# Patient Record
Sex: FEMALE | Race: WHITE | NOT HISPANIC OR LATINO | Employment: OTHER | URBAN - METROPOLITAN AREA
[De-identification: names, ages, dates, MRNs, and addresses within clinical notes are randomized per-mention and may not be internally consistent; named-entity substitution may affect disease eponyms.]

---

## 2017-02-04 ENCOUNTER — APPOINTMENT (EMERGENCY)
Dept: RADIOLOGY | Facility: HOSPITAL | Age: 72
End: 2017-02-04
Payer: MEDICARE

## 2017-02-04 ENCOUNTER — HOSPITAL ENCOUNTER (EMERGENCY)
Facility: HOSPITAL | Age: 72
Discharge: HOME | End: 2017-02-05
Attending: EMERGENCY MEDICINE | Admitting: EMERGENCY MEDICINE
Payer: MEDICARE

## 2017-02-04 DIAGNOSIS — N20.0 KIDNEY STONE: Primary | ICD-10-CM

## 2017-02-04 LAB
ALBUMIN SERPL BCP-MCNC: 3.6 G/DL (ref 3.5–5)
ALP SERPL-CCNC: 210 U/L (ref 46–116)
ALT SERPL W P-5'-P-CCNC: 27 U/L (ref 12–78)
ANION GAP SERPL CALCULATED.3IONS-SCNC: 13 MMOL/L (ref 4–13)
AST SERPL W P-5'-P-CCNC: 19 U/L (ref 5–45)
BACTERIA UR QL AUTO: ABNORMAL /HPF
BASOPHILS # BLD AUTO: 0.01 THOUSANDS/ΜL (ref 0–0.1)
BASOPHILS NFR BLD AUTO: 0 % (ref 0–1)
BILIRUB SERPL-MCNC: 0.63 MG/DL (ref 0.2–1)
BILIRUB UR QL STRIP: NEGATIVE
BUN SERPL-MCNC: 15 MG/DL (ref 5–25)
CALCIUM SERPL-MCNC: 10.9 MG/DL (ref 8.3–10.1)
CHLORIDE SERPL-SCNC: 107 MMOL/L (ref 100–108)
CLARITY UR: CLEAR
CO2 SERPL-SCNC: 22 MMOL/L (ref 21–32)
COLOR UR: YELLOW
COLOR, POC: NORMAL
CREAT SERPL-MCNC: 0.86 MG/DL (ref 0.6–1.3)
EOSINOPHIL # BLD AUTO: 0.03 THOUSAND/ΜL (ref 0–0.61)
EOSINOPHIL NFR BLD AUTO: 0 % (ref 0–6)
ERYTHROCYTE [DISTWIDTH] IN BLOOD BY AUTOMATED COUNT: 12.6 % (ref 11.6–15.1)
GFR SERPL CREATININE-BSD FRML MDRD: >60 ML/MIN/1.73SQ M
GLUCOSE SERPL-MCNC: 117 MG/DL (ref 65–140)
GLUCOSE UR STRIP-MCNC: NEGATIVE MG/DL
HCT VFR BLD AUTO: 32.2 % (ref 34.8–46.1)
HGB BLD-MCNC: 10.7 G/DL (ref 11.5–15.4)
HGB UR QL STRIP.AUTO: ABNORMAL
HOLD SPECIMEN: NORMAL
HOLD SPECIMEN: NORMAL
HYALINE CASTS #/AREA URNS LPF: ABNORMAL /LPF
KETONES UR STRIP-MCNC: ABNORMAL MG/DL
LACTATE SERPL-SCNC: 3.1 MMOL/L (ref 0.5–2)
LEUKOCYTE ESTERASE UR QL STRIP: ABNORMAL
LIPASE SERPL-CCNC: 82 U/L (ref 73–393)
LYMPHOCYTES # BLD AUTO: 1.6 THOUSANDS/ΜL (ref 0.6–4.47)
LYMPHOCYTES NFR BLD AUTO: 23 % (ref 14–44)
MCH RBC QN AUTO: 28.2 PG (ref 26.8–34.3)
MCHC RBC AUTO-ENTMCNC: 33.2 G/DL (ref 31.4–37.4)
MCV RBC AUTO: 85 FL (ref 82–98)
MONOCYTES # BLD AUTO: 0.47 THOUSAND/ΜL (ref 0.17–1.22)
MONOCYTES NFR BLD AUTO: 7 % (ref 4–12)
NEUTROPHILS # BLD AUTO: 4.82 THOUSANDS/ΜL (ref 1.85–7.62)
NEUTS SEG NFR BLD AUTO: 70 % (ref 43–75)
NITRITE UR QL STRIP: NEGATIVE
NON-SQ EPI CELLS URNS QL MICRO: ABNORMAL /HPF
NRBC BLD AUTO-RTO: 0 /100 WBCS
PH UR STRIP.AUTO: 7.5 [PH] (ref 4.5–8)
PLATELET # BLD AUTO: 483 THOUSANDS/UL (ref 149–390)
PMV BLD AUTO: 8.9 FL (ref 8.9–12.7)
POTASSIUM SERPL-SCNC: 3.7 MMOL/L (ref 3.5–5.3)
PROT SERPL-MCNC: 7.9 G/DL (ref 6.4–8.2)
PROT UR STRIP-MCNC: NEGATIVE MG/DL
RBC # BLD AUTO: 3.8 MILLION/UL (ref 3.81–5.12)
RBC #/AREA URNS AUTO: ABNORMAL /HPF
SODIUM SERPL-SCNC: 142 MMOL/L (ref 136–145)
SP GR UR STRIP.AUTO: 1.02 (ref 1–1.03)
UROBILINOGEN UR QL STRIP.AUTO: 1 E.U./DL
WBC # BLD AUTO: 6.95 THOUSAND/UL (ref 4.31–10.16)
WBC #/AREA URNS AUTO: ABNORMAL /HPF

## 2017-02-04 PROCEDURE — 96361 HYDRATE IV INFUSION ADD-ON: CPT

## 2017-02-04 PROCEDURE — 80053 COMPREHEN METABOLIC PANEL: CPT

## 2017-02-04 PROCEDURE — 74177 CT ABD & PELVIS W/CONTRAST: CPT

## 2017-02-04 PROCEDURE — 96374 THER/PROPH/DIAG INJ IV PUSH: CPT

## 2017-02-04 PROCEDURE — 36415 COLL VENOUS BLD VENIPUNCTURE: CPT | Performed by: EMERGENCY MEDICINE

## 2017-02-04 PROCEDURE — 87086 URINE CULTURE/COLONY COUNT: CPT

## 2017-02-04 PROCEDURE — 81001 URINALYSIS AUTO W/SCOPE: CPT

## 2017-02-04 PROCEDURE — 83690 ASSAY OF LIPASE: CPT

## 2017-02-04 PROCEDURE — 96375 TX/PRO/DX INJ NEW DRUG ADDON: CPT

## 2017-02-04 PROCEDURE — 85025 COMPLETE CBC W/AUTO DIFF WBC: CPT

## 2017-02-04 PROCEDURE — 36415 COLL VENOUS BLD VENIPUNCTURE: CPT

## 2017-02-04 PROCEDURE — 83605 ASSAY OF LACTIC ACID: CPT | Performed by: EMERGENCY MEDICINE

## 2017-02-04 PROCEDURE — 81002 URINALYSIS NONAUTO W/O SCOPE: CPT | Performed by: EMERGENCY MEDICINE

## 2017-02-04 RX ORDER — ONDANSETRON 2 MG/ML
4 INJECTION INTRAMUSCULAR; INTRAVENOUS ONCE
Status: COMPLETED | OUTPATIENT
Start: 2017-02-04 | End: 2017-02-04

## 2017-02-04 RX ORDER — DIAZEPAM 5 MG/1
5 TABLET ORAL
COMMUNITY
Start: 2017-01-20 | End: 2018-11-23

## 2017-02-04 RX ORDER — CIPROFLOXACIN 500 MG/1
500 TABLET, FILM COATED ORAL ONCE
Status: COMPLETED | OUTPATIENT
Start: 2017-02-04 | End: 2017-02-05

## 2017-02-04 RX ORDER — OXYCODONE HYDROCHLORIDE AND ACETAMINOPHEN 5; 325 MG/1; MG/1
1-2 TABLET ORAL
COMMUNITY
Start: 2017-01-20 | End: 2018-11-23

## 2017-02-04 RX ADMIN — SODIUM CHLORIDE 1000 ML: 0.9 INJECTION, SOLUTION INTRAVENOUS at 21:35

## 2017-02-04 RX ADMIN — HYDROMORPHONE HYDROCHLORIDE 1 MG: 1 INJECTION, SOLUTION INTRAMUSCULAR; INTRAVENOUS; SUBCUTANEOUS at 21:35

## 2017-02-04 RX ADMIN — ONDANSETRON 4 MG: 2 INJECTION INTRAMUSCULAR; INTRAVENOUS at 21:35

## 2017-02-04 RX ADMIN — SODIUM CHLORIDE 500 ML: 0.9 INJECTION, SOLUTION INTRAVENOUS at 23:45

## 2017-02-04 RX ADMIN — IOHEXOL 99 ML: 350 INJECTION, SOLUTION INTRAVENOUS at 22:20

## 2017-02-05 VITALS
DIASTOLIC BLOOD PRESSURE: 76 MMHG | WEIGHT: 175 LBS | HEART RATE: 82 BPM | OXYGEN SATURATION: 94 % | TEMPERATURE: 98 F | RESPIRATION RATE: 18 BRPM | SYSTOLIC BLOOD PRESSURE: 151 MMHG

## 2017-02-05 LAB — LACTATE SERPL-SCNC: 1.8 MMOL/L (ref 0.5–2)

## 2017-02-05 PROCEDURE — 99284 EMERGENCY DEPT VISIT MOD MDM: CPT

## 2017-02-05 PROCEDURE — 83605 ASSAY OF LACTIC ACID: CPT | Performed by: EMERGENCY MEDICINE

## 2017-02-05 RX ORDER — ONDANSETRON 4 MG/1
4 TABLET, FILM COATED ORAL EVERY 8 HOURS PRN
Qty: 20 TABLET | Refills: 0 | Status: SHIPPED | OUTPATIENT
Start: 2017-02-05 | End: 2018-11-23

## 2017-02-05 RX ORDER — CIPROFLOXACIN 500 MG/1
500 TABLET, FILM COATED ORAL 2 TIMES DAILY
Qty: 14 TABLET | Refills: 0 | Status: SHIPPED | OUTPATIENT
Start: 2017-02-05 | End: 2017-02-12

## 2017-02-05 RX ADMIN — CIPROFLOXACIN HYDROCHLORIDE 500 MG: 500 TABLET, FILM COATED ORAL at 00:04

## 2017-02-06 LAB — BACTERIA UR CULT: NORMAL

## 2018-11-23 ENCOUNTER — HOSPITAL ENCOUNTER (EMERGENCY)
Facility: HOSPITAL | Age: 73
Discharge: HOME | End: 2018-11-24
Attending: EMERGENCY MEDICINE
Payer: MEDICARE

## 2018-11-23 ENCOUNTER — APPOINTMENT (EMERGENCY)
Dept: RADIOLOGY | Facility: HOSPITAL | Age: 73
End: 2018-11-23
Payer: MEDICARE

## 2018-11-23 VITALS
DIASTOLIC BLOOD PRESSURE: 64 MMHG | TEMPERATURE: 98.2 F | SYSTOLIC BLOOD PRESSURE: 138 MMHG | WEIGHT: 182 LBS | OXYGEN SATURATION: 97 % | RESPIRATION RATE: 18 BRPM | HEART RATE: 75 BPM

## 2018-11-23 DIAGNOSIS — J20.9 ACUTE BRONCHITIS: Primary | ICD-10-CM

## 2018-11-23 PROCEDURE — 71046 X-RAY EXAM CHEST 2 VIEWS: CPT

## 2018-11-23 PROCEDURE — 99283 EMERGENCY DEPT VISIT LOW MDM: CPT

## 2018-11-23 RX ORDER — AZITHROMYCIN 250 MG/1
250 TABLET, FILM COATED ORAL EVERY 24 HOURS
Qty: 4 TABLET | Refills: 0 | Status: SHIPPED | OUTPATIENT
Start: 2018-11-23 | End: 2018-11-27

## 2018-11-23 RX ORDER — AZITHROMYCIN 250 MG/1
500 TABLET, FILM COATED ORAL ONCE
Status: COMPLETED | OUTPATIENT
Start: 2018-11-24 | End: 2018-11-24

## 2018-11-24 RX ADMIN — AZITHROMYCIN 500 MG: 250 TABLET, FILM COATED ORAL at 00:02

## 2018-11-24 RX ADMIN — DEXAMETHASONE SODIUM PHOSPHATE 10 MG: 10 INJECTION, SOLUTION INTRAMUSCULAR; INTRAVENOUS at 00:02

## 2018-11-24 NOTE — DISCHARGE INSTRUCTIONS
Acute Bronchitis   WHAT YOU NEED TO KNOW:   Acute bronchitis is swelling and irritation in the air passages of your lungs  This irritation may cause you to cough or have other breathing problems  Acute bronchitis often starts because of another illness, such as a cold or the flu  The illness spreads from your nose and throat to your windpipe and airways  Bronchitis is often called a chest cold  Acute bronchitis lasts about 3 to 6 weeks and is usually not a serious illness  Your cough can last for several weeks  DISCHARGE INSTRUCTIONS:   Return to the emergency department if:   · You cough up blood  · Your lips or fingernails turn blue  · You feel like you are not getting enough air when you breathe  Contact your healthcare provider if:   · You have a fever  · Your breathing problems do not go away or get worse  · Your cough does not get better within 4 weeks  · You have questions or concerns about your condition or care  Self-care:   · Get more rest   Rest helps your body to heal  Slowly start to do more each day  Rest when you feel it is needed  · Avoid irritants in the air  Avoid chemicals, fumes, and dust  Wear a face mask if you must work around dust or fumes  Stay inside on days when air pollution levels are high  If you have allergies, stay inside when pollen counts are high  Do not use aerosol products, such as spray-on deodorant, bug spray, and hair spray  · Do not smoke or be around others who smoke  Nicotine and other chemicals in cigarettes and cigars damages the cilia that move mucus out of your lungs  Ask your healthcare provider for information if you currently smoke and need help to quit  E-cigarettes or smokeless tobacco still contain nicotine  Talk to your healthcare provider before you use these products  · Drink liquids as directed  Liquids help keep your air passages moist and help you cough up mucus   You may need to drink more liquids when you have acute bronchitis  Ask how much liquid to drink each day and which liquids are best for you  · Use a humidifier or vaporizer  Use a cool mist humidifier or a vaporizer to increase air moisture in your home  This may make it easier for you to breathe and help decrease your cough  Decrease risk for acute bronchitis:   · Get the vaccinations you need  Ask your healthcare provider if you should get vaccinated against the flu or pneumonia  · Prevent the spread of germs  You can decrease your risk of acute bronchitis and other illnesses by doing the following:     [facility] your hands often with soap and water  Carry germ-killing hand lotion or gel with you  You can use the lotion or gel to clean your hands when soap and water are not available  ¨ Do not touch your eyes, nose, or mouth unless you have washed your hands first     ¨ Always cover your mouth when you cough to prevent the spread of germs  It is best to cough into a tissue or your shirt sleeve instead of into your hand  Ask those around you cover their mouths when they cough  ¨ Try to avoid people who have a cold or the flu  If you are sick, stay away from others as much as possible  Medicines: Your healthcare provider may  give you any of the following:  · Ibuprofen or acetaminophen  are medicines that help lower your fever  They are available without a doctor's order  Ask your healthcare provider which medicine is right for you  Ask how much to take and how often to take it  Follow directions  These medicines can cause stomach bleeding if not taken correctly  Ibuprofen can cause kidney damage  Do not take ibuprofen if you have kidney disease, an ulcer, or allergies to aspirin  Acetaminophen can cause liver damage  Do not take more than 4,000 milligrams in 24 hours  · Decongestants  help loosen mucus in your lungs and make it easier to cough up  This can help you breathe easier  · Cough suppressants  decrease your urge to cough   If your cough produces mucus, do not take a cough suppressant unless your healthcare provider tells you to  Your healthcare provider may suggest that you take a cough suppressant at night so you can rest     · Inhalers  may be given  Your healthcare provider may give you one or more inhalers to help you breathe easier and cough less  An inhaler gives your medicine to open your airways  Ask your healthcare provider to show you how to use your inhaler correctly  · Take your medicine as directed  Contact your healthcare provider if you think your medicine is not helping or if you have side effects  Tell him of her if you are allergic to any medicine  Keep a list of the medicines, vitamins, and herbs you take  Include the amounts, and when and why you take them  Bring the list or the pill bottles to follow-up visits  Carry your medicine list with you in case of an emergency  Follow up with your healthcare provider as directed:  Write down questions you have so you will remember to ask them during your follow-up visits  © 2017 [address] [employer] Information is for End User's use only and may not be sold, redistributed or otherwise used for commercial purposes  All illustrations and images included in CareNotes® are the copyrighted property of [employer]  or [employer]  The above information is an  only  It is not intended as medical advice for individual conditions or treatments  Talk to your doctor, nurse or pharmacist before following any medical regimen to see if it is safe and effective for you

## 2018-11-24 NOTE — ED PROVIDER NOTES
History  Chief Complaint   Patient presents with    Cough     patient c/o of a cough that started 1 week ago; patient also c/o of chest pain from coughing; patient has been taking medications at home with no relief      HPI     68 healthy female p/w cough/congestion  Onset 1 week ago  Hacking  Possibly productive  Constant  Waxing and waning  asssoc nasal congestion  No dyspnea/cp  No other a/e factors  Moderate intensity  Concerned because has 3year old grandchild  Not smoker  No daily meds  Exam unremarkable  A/P: cough, cxr to r/o pna  Symptomatic tx  None       History reviewed  No pertinent past medical history  Past Surgical History:   Procedure Laterality Date    BACK SURGERY      FOOT SURGERY         History reviewed  No pertinent family history  I have reviewed and agree with the history as documented  Social History   Substance Use Topics    Smoking status: Never Smoker    Smokeless tobacco: Never Used    Alcohol use No        Review of Systems   Constitutional: Negative for chills, diaphoresis, fatigue and fever  HENT: Negative for facial swelling and nosebleeds  Eyes: Negative for photophobia, pain and visual disturbance  Respiratory: Positive for cough  Negative for apnea, shortness of breath and wheezing  Cardiovascular: Negative for chest pain, palpitations and leg swelling  Gastrointestinal: Negative for abdominal distention, abdominal pain, anal bleeding, blood in stool, diarrhea, nausea, rectal pain and vomiting  Endocrine: Negative for polydipsia and polyuria  Genitourinary: Negative for decreased urine volume, difficulty urinating, dysuria, flank pain and frequency  Musculoskeletal: Negative for back pain, neck pain and neck stiffness  Skin: Negative for color change and rash  Allergic/Immunologic: Negative for environmental allergies and immunocompromised state     Neurological: Negative for dizziness, syncope, weakness, light-headedness and headaches  Hematological: Negative for adenopathy  Does not bruise/bleed easily  Psychiatric/Behavioral: Negative for dysphoric mood  The patient is not nervous/anxious  All other systems reviewed and are negative  Physical Exam  Physical Exam   Constitutional: She is oriented to person, place, and time  She appears well-developed and well-nourished  No distress  HENT:   Head: Normocephalic and atraumatic  Nose: Nose normal    Eyes: Pupils are equal, round, and reactive to light  Conjunctivae and EOM are normal  No scleral icterus  Neck: Normal range of motion  Neck supple  No JVD present  No tracheal deviation present  No thyromegaly present  Cardiovascular: Normal rate, regular rhythm, normal heart sounds and intact distal pulses  Exam reveals no gallop and no friction rub  Pulmonary/Chest: Effort normal and breath sounds normal  No respiratory distress  She has no wheezes  She has no rales  She exhibits no tenderness  Abdominal: Soft  Bowel sounds are normal  She exhibits no distension and no mass  There is no tenderness  There is no rebound and no guarding  No hernia  Musculoskeletal: Normal range of motion  She exhibits no edema, tenderness or deformity  Neurological: She is alert and oriented to person, place, and time  She has normal reflexes  No cranial nerve deficit  Coordination normal    Skin: Skin is warm and dry  She is not diaphoretic  No erythema  Psychiatric: She has a normal mood and affect  Her behavior is normal    Nursing note and vitals reviewed        Vital Signs  ED Triage Vitals   Temperature Pulse Respirations Blood Pressure SpO2   11/23/18 2209 11/23/18 2209 11/23/18 2209 11/23/18 2209 11/23/18 2209   98 2 °F (36 8 °C) 78 20 (!) 172/73 98 %      Temp Source Heart Rate Source Patient Position - Orthostatic VS BP Location FiO2 (%)   11/23/18 2209 11/23/18 2209 11/23/18 2209 11/23/18 2209 --   Temporal Monitor Sitting Left arm       Pain Score       11/23/18 7374 No Pain           Vitals:    11/23/18 2209 11/23/18 2331   BP: (!) 172/73 138/64   Pulse: 78 75   Patient Position - Orthostatic VS: Sitting Sitting       Visual Acuity      ED Medications  Medications   azithromycin (ZITHROMAX) tablet 500 mg (500 mg Oral Given 11/24/18 0002)   dexamethasone 10 mg/mL oral liquid 10 mg 1 mL (10 mg Oral Given 11/24/18 0002)       Diagnostic Studies  Results Reviewed     None                 XR chest 2 views   Final Result by Fannie Guzman DO (11/24 1374)   No acute cardiopulmonary disease  Workstation performed: [id number]                    Procedures  Procedures       Phone Contacts  ED Phone Contact    ED Course                               MDM  Number of Diagnoses or Management Options  Acute bronchitis: new and requires workup     Amount and/or Complexity of Data Reviewed  Tests in the radiology section of CPT®: ordered  Independent visualization of images, tracings, or specimens: yes      CritCare Time    Disposition  Final diagnoses:   Acute bronchitis     Time reflects when diagnosis was documented in both MDM as applicable and the Disposition within this note     Time User Action Codes Description Comment    11/23/2018 11:55 PM Shaka Avery [J20 9] Acute bronchitis       ED Disposition     ED Disposition Condition Comment    Discharge  2 [facility] discharge to home/self care  Condition at discharge: Good        Follow-up Information     Follow up With Specialties Details Why Contact Info    Chriss Montilla DO   If symptoms worsen [address] [place] [address]  [phone]            Discharge Medication List as of 11/24/2018 12:00 AM      START taking these medications    Details   azithromycin (ZITHROMAX) 250 mg tablet Take 1 tablet (250 mg total) by mouth every 24 hours for 4 days, Starting Fri 11/23/2018, Until Tue 11/27/2018, Print           No discharge procedures on file      ED Provider  Electronically Signed by Kymberly Rodriguez, DO  11/24/18 1115

## 2018-11-24 NOTE — ED NOTES
Went in to do an EKG on pt due to pt complaint  Pt requested to "speak with a doctor", to see if EKG is needed before said test is performed  Pt very complaint and agreeable  RN made aware        Johanna Mora  11/23/18 1077

## 2019-03-30 ENCOUNTER — HOSPITAL ENCOUNTER (OUTPATIENT)
Facility: HOSPITAL | Age: 74
Setting detail: OBSERVATION
Discharge: HOME | End: 2019-03-31
Attending: EMERGENCY MEDICINE | Admitting: HOSPITALIST
Payer: MEDICARE

## 2019-03-30 ENCOUNTER — APPOINTMENT (EMERGENCY)
Dept: CT IMAGING | Facility: HOSPITAL | Age: 74
End: 2019-03-30
Payer: MEDICARE

## 2019-03-30 DIAGNOSIS — N39.0 URINARY TRACT INFECTION WITHOUT HEMATURIA, SITE UNSPECIFIED: ICD-10-CM

## 2019-03-30 DIAGNOSIS — N12 PYELONEPHRITIS: Primary | ICD-10-CM

## 2019-03-30 DIAGNOSIS — N20.1 URETERAL CALCULUS: ICD-10-CM

## 2019-03-30 DIAGNOSIS — N20.1 CALCULUS OF PROXIMAL RIGHT URETER: ICD-10-CM

## 2019-03-30 PROBLEM — N13.30 HYDRONEPHROSIS: Status: ACTIVE | Noted: 2019-03-30

## 2019-03-30 PROBLEM — I10 ACCELERATED HYPERTENSION: Status: ACTIVE | Noted: 2019-03-30

## 2019-03-30 LAB
ALBUMIN SERPL BCP-MCNC: 3.6 G/DL (ref 3.5–5)
ALP SERPL-CCNC: 156 U/L (ref 46–116)
ALT SERPL W P-5'-P-CCNC: 36 U/L (ref 12–78)
ANION GAP SERPL CALCULATED.3IONS-SCNC: 13 MMOL/L (ref 4–13)
AST SERPL W P-5'-P-CCNC: 31 U/L (ref 5–45)
BACTERIA UR QL AUTO: ABNORMAL /HPF
BASOPHILS # BLD AUTO: 0.02 THOUSANDS/ΜL (ref 0–0.1)
BASOPHILS NFR BLD AUTO: 0 % (ref 0–1)
BILIRUB SERPL-MCNC: 0.68 MG/DL (ref 0.2–1)
BILIRUB UR QL STRIP: NEGATIVE
BUN SERPL-MCNC: 16 MG/DL (ref 5–25)
CALCIUM SERPL-MCNC: 10.4 MG/DL (ref 8.3–10.1)
CHLORIDE SERPL-SCNC: 104 MMOL/L (ref 100–108)
CLARITY UR: ABNORMAL
CLARITY, POC: ABNORMAL
CO2 SERPL-SCNC: 25 MMOL/L (ref 21–32)
COLOR UR: ABNORMAL
COLOR, POC: ABNORMAL
CREAT SERPL-MCNC: 0.63 MG/DL (ref 0.6–1.3)
EOSINOPHIL # BLD AUTO: 0.08 THOUSAND/ΜL (ref 0–0.61)
EOSINOPHIL NFR BLD AUTO: 2 % (ref 0–6)
ERYTHROCYTE [DISTWIDTH] IN BLOOD BY AUTOMATED COUNT: 12.3 % (ref 11.6–15.1)
GFR SERPL CREATININE-BSD FRML MDRD: 89 ML/MIN/1.73SQ M
GLUCOSE SERPL-MCNC: 132 MG/DL (ref 65–140)
GLUCOSE UR STRIP-MCNC: NEGATIVE MG/DL
HCT VFR BLD AUTO: 42.8 % (ref 34.8–46.1)
HGB BLD-MCNC: 14.2 G/DL (ref 11.5–15.4)
HGB UR QL STRIP.AUTO: ABNORMAL
IMM GRANULOCYTES # BLD AUTO: 0.01 THOUSAND/UL (ref 0–0.2)
IMM GRANULOCYTES NFR BLD AUTO: 0 % (ref 0–2)
KETONES UR STRIP-MCNC: NEGATIVE MG/DL
LEUKOCYTE ESTERASE UR QL STRIP: ABNORMAL
LYMPHOCYTES # BLD AUTO: 1.7 THOUSANDS/ΜL (ref 0.6–4.47)
LYMPHOCYTES NFR BLD AUTO: 32 % (ref 14–44)
MCH RBC QN AUTO: 28.9 PG (ref 26.8–34.3)
MCHC RBC AUTO-ENTMCNC: 33.2 G/DL (ref 31.4–37.4)
MCV RBC AUTO: 87 FL (ref 82–98)
MONOCYTES # BLD AUTO: 0.31 THOUSAND/ΜL (ref 0.17–1.22)
MONOCYTES NFR BLD AUTO: 6 % (ref 4–12)
NEUTROPHILS # BLD AUTO: 3.26 THOUSANDS/ΜL (ref 1.85–7.62)
NEUTS SEG NFR BLD AUTO: 60 % (ref 43–75)
NITRITE UR QL STRIP: NEGATIVE
NON-SQ EPI CELLS URNS QL MICRO: ABNORMAL /HPF
NRBC BLD AUTO-RTO: 0 /100 WBCS
PH UR STRIP.AUTO: 6 [PH] (ref 4.5–8)
PLATELET # BLD AUTO: 250 THOUSANDS/UL (ref 149–390)
PMV BLD AUTO: 9.6 FL (ref 8.9–12.7)
POTASSIUM SERPL-SCNC: 3.8 MMOL/L (ref 3.5–5.3)
PROT SERPL-MCNC: 7.5 G/DL (ref 6.4–8.2)
PROT UR STRIP-MCNC: NEGATIVE MG/DL
RBC # BLD AUTO: 4.92 MILLION/UL (ref 3.81–5.12)
RBC #/AREA URNS AUTO: ABNORMAL /HPF
SODIUM SERPL-SCNC: 142 MMOL/L (ref 136–145)
SP GR UR STRIP.AUTO: 1.02 (ref 1–1.03)
UROBILINOGEN UR QL STRIP.AUTO: 0.2 E.U./DL
WBC # BLD AUTO: 5.38 THOUSAND/UL (ref 4.31–10.16)
WBC #/AREA URNS AUTO: ABNORMAL /HPF

## 2019-03-30 PROCEDURE — 87086 URINE CULTURE/COLONY COUNT: CPT | Performed by: PHYSICIAN ASSISTANT

## 2019-03-30 PROCEDURE — 85025 COMPLETE CBC W/AUTO DIFF WBC: CPT | Performed by: EMERGENCY MEDICINE

## 2019-03-30 PROCEDURE — 80053 COMPREHEN METABOLIC PANEL: CPT | Performed by: EMERGENCY MEDICINE

## 2019-03-30 PROCEDURE — 81001 URINALYSIS AUTO W/SCOPE: CPT

## 2019-03-30 PROCEDURE — 96376 TX/PRO/DX INJ SAME DRUG ADON: CPT

## 2019-03-30 PROCEDURE — 36415 COLL VENOUS BLD VENIPUNCTURE: CPT | Performed by: EMERGENCY MEDICINE

## 2019-03-30 PROCEDURE — 1123F ACP DISCUSS/DSCN MKR DOCD: CPT | Performed by: HOSPITALIST

## 2019-03-30 PROCEDURE — 99220 PR INITIAL OBSERVATION CARE/DAY 70 MINUTES: CPT | Performed by: HOSPITALIST

## 2019-03-30 PROCEDURE — 96374 THER/PROPH/DIAG INJ IV PUSH: CPT

## 2019-03-30 PROCEDURE — 74176 CT ABD & PELVIS W/O CONTRAST: CPT

## 2019-03-30 PROCEDURE — 99285 EMERGENCY DEPT VISIT HI MDM: CPT

## 2019-03-30 PROCEDURE — 96375 TX/PRO/DX INJ NEW DRUG ADDON: CPT

## 2019-03-30 RX ORDER — MORPHINE SULFATE 4 MG/ML
4 INJECTION, SOLUTION INTRAMUSCULAR; INTRAVENOUS EVERY 4 HOURS PRN
Status: DISCONTINUED | OUTPATIENT
Start: 2019-03-30 | End: 2019-03-31 | Stop reason: HOSPADM

## 2019-03-30 RX ORDER — ACETAMINOPHEN 325 MG/1
650 TABLET ORAL EVERY 6 HOURS PRN
Status: DISCONTINUED | OUTPATIENT
Start: 2019-03-30 | End: 2019-03-31 | Stop reason: HOSPADM

## 2019-03-30 RX ORDER — HYDRALAZINE HYDROCHLORIDE 20 MG/ML
10 INJECTION INTRAMUSCULAR; INTRAVENOUS EVERY 4 HOURS PRN
Status: DISCONTINUED | OUTPATIENT
Start: 2019-03-30 | End: 2019-03-31 | Stop reason: HOSPADM

## 2019-03-30 RX ORDER — MORPHINE SULFATE 4 MG/ML
4 INJECTION, SOLUTION INTRAMUSCULAR; INTRAVENOUS
Status: COMPLETED | OUTPATIENT
Start: 2019-03-30 | End: 2019-03-30

## 2019-03-30 RX ORDER — ONDANSETRON 2 MG/ML
4 INJECTION INTRAMUSCULAR; INTRAVENOUS EVERY 6 HOURS PRN
Status: DISCONTINUED | OUTPATIENT
Start: 2019-03-30 | End: 2019-03-31 | Stop reason: HOSPADM

## 2019-03-30 RX ORDER — ONDANSETRON 2 MG/ML
4 INJECTION INTRAMUSCULAR; INTRAVENOUS ONCE
Status: COMPLETED | OUTPATIENT
Start: 2019-03-30 | End: 2019-03-30

## 2019-03-30 RX ORDER — TEMAZEPAM 15 MG/1
15 CAPSULE ORAL ONCE
Status: COMPLETED | OUTPATIENT
Start: 2019-03-30 | End: 2019-03-30

## 2019-03-30 RX ORDER — MORPHINE SULFATE 4 MG/ML
4 INJECTION, SOLUTION INTRAMUSCULAR; INTRAVENOUS ONCE AS NEEDED
Status: DISCONTINUED | OUTPATIENT
Start: 2019-03-30 | End: 2019-03-31 | Stop reason: HOSPADM

## 2019-03-30 RX ORDER — SODIUM CHLORIDE 9 MG/ML
125 INJECTION, SOLUTION INTRAVENOUS CONTINUOUS
Status: DISCONTINUED | OUTPATIENT
Start: 2019-03-30 | End: 2019-03-31 | Stop reason: HOSPADM

## 2019-03-30 RX ORDER — KETOROLAC TROMETHAMINE 30 MG/ML
30 INJECTION, SOLUTION INTRAMUSCULAR; INTRAVENOUS EVERY 6 HOURS PRN
Status: DISCONTINUED | OUTPATIENT
Start: 2019-03-30 | End: 2019-03-31 | Stop reason: HOSPADM

## 2019-03-30 RX ADMIN — MORPHINE SULFATE 4 MG: 4 INJECTION INTRAVENOUS at 09:56

## 2019-03-30 RX ADMIN — HYDRALAZINE HYDROCHLORIDE 10 MG: 20 INJECTION INTRAMUSCULAR; INTRAVENOUS at 13:51

## 2019-03-30 RX ADMIN — MORPHINE SULFATE 4 MG: 4 INJECTION INTRAVENOUS at 09:25

## 2019-03-30 RX ADMIN — TEMAZEPAM 15 MG: 15 CAPSULE ORAL at 22:50

## 2019-03-30 RX ADMIN — SODIUM CHLORIDE 125 ML/HR: 0.9 INJECTION, SOLUTION INTRAVENOUS at 22:27

## 2019-03-30 RX ADMIN — MORPHINE SULFATE 4 MG: 4 INJECTION INTRAVENOUS at 14:46

## 2019-03-30 RX ADMIN — ONDANSETRON 4 MG: 2 INJECTION INTRAMUSCULAR; INTRAVENOUS at 14:44

## 2019-03-30 RX ADMIN — ONDANSETRON 4 MG: 2 INJECTION INTRAMUSCULAR; INTRAVENOUS at 09:29

## 2019-03-30 RX ADMIN — SODIUM CHLORIDE 125 ML/HR: 0.9 INJECTION, SOLUTION INTRAVENOUS at 14:34

## 2019-03-30 RX ADMIN — MORPHINE SULFATE 4 MG: 4 INJECTION INTRAVENOUS at 10:47

## 2019-03-30 RX ADMIN — CEFTRIAXONE 1000 MG: 1 INJECTION, POWDER, FOR SOLUTION INTRAMUSCULAR; INTRAVENOUS at 12:44

## 2019-03-31 VITALS
OXYGEN SATURATION: 94 % | RESPIRATION RATE: 18 BRPM | SYSTOLIC BLOOD PRESSURE: 140 MMHG | WEIGHT: 182 LBS | TEMPERATURE: 97.4 F | HEIGHT: 63 IN | HEART RATE: 85 BPM | BODY MASS INDEX: 32.25 KG/M2 | DIASTOLIC BLOOD PRESSURE: 61 MMHG

## 2019-03-31 LAB
ANION GAP SERPL CALCULATED.3IONS-SCNC: 8 MMOL/L (ref 4–13)
BACTERIA UR CULT: NORMAL
BASOPHILS # BLD AUTO: 0.01 THOUSANDS/ΜL (ref 0–0.1)
BASOPHILS NFR BLD AUTO: 0 % (ref 0–1)
BUN SERPL-MCNC: 16 MG/DL (ref 5–25)
CALCIUM SERPL-MCNC: 9.5 MG/DL (ref 8.3–10.1)
CHLORIDE SERPL-SCNC: 109 MMOL/L (ref 100–108)
CO2 SERPL-SCNC: 25 MMOL/L (ref 21–32)
CREAT SERPL-MCNC: 0.58 MG/DL (ref 0.6–1.3)
EOSINOPHIL # BLD AUTO: 0.11 THOUSAND/ΜL (ref 0–0.61)
EOSINOPHIL NFR BLD AUTO: 3 % (ref 0–6)
ERYTHROCYTE [DISTWIDTH] IN BLOOD BY AUTOMATED COUNT: 12.5 % (ref 11.6–15.1)
GFR SERPL CREATININE-BSD FRML MDRD: 92 ML/MIN/1.73SQ M
GLUCOSE SERPL-MCNC: 103 MG/DL (ref 65–140)
HCT VFR BLD AUTO: 36.2 % (ref 34.8–46.1)
HGB BLD-MCNC: 11.6 G/DL (ref 11.5–15.4)
IMM GRANULOCYTES # BLD AUTO: 0.01 THOUSAND/UL (ref 0–0.2)
IMM GRANULOCYTES NFR BLD AUTO: 0 % (ref 0–2)
LYMPHOCYTES # BLD AUTO: 1.03 THOUSANDS/ΜL (ref 0.6–4.47)
LYMPHOCYTES NFR BLD AUTO: 26 % (ref 14–44)
MCH RBC QN AUTO: 29.1 PG (ref 26.8–34.3)
MCHC RBC AUTO-ENTMCNC: 32 G/DL (ref 31.4–37.4)
MCV RBC AUTO: 91 FL (ref 82–98)
MONOCYTES # BLD AUTO: 0.36 THOUSAND/ΜL (ref 0.17–1.22)
MONOCYTES NFR BLD AUTO: 9 % (ref 4–12)
NEUTROPHILS # BLD AUTO: 2.43 THOUSANDS/ΜL (ref 1.85–7.62)
NEUTS SEG NFR BLD AUTO: 62 % (ref 43–75)
NRBC BLD AUTO-RTO: 0 /100 WBCS
PLATELET # BLD AUTO: 200 THOUSANDS/UL (ref 149–390)
PMV BLD AUTO: 10 FL (ref 8.9–12.7)
POTASSIUM SERPL-SCNC: 3.5 MMOL/L (ref 3.5–5.3)
RBC # BLD AUTO: 3.98 MILLION/UL (ref 3.81–5.12)
SODIUM SERPL-SCNC: 142 MMOL/L (ref 136–145)
WBC # BLD AUTO: 3.95 THOUSAND/UL (ref 4.31–10.16)

## 2019-03-31 PROCEDURE — 80048 BASIC METABOLIC PNL TOTAL CA: CPT | Performed by: PHYSICIAN ASSISTANT

## 2019-03-31 PROCEDURE — 85025 COMPLETE CBC W/AUTO DIFF WBC: CPT | Performed by: PHYSICIAN ASSISTANT

## 2019-03-31 PROCEDURE — 99204 OFFICE O/P NEW MOD 45 MIN: CPT | Performed by: UROLOGY

## 2019-03-31 PROCEDURE — 99217 PR OBSERVATION CARE DISCHARGE MANAGEMENT: CPT | Performed by: HOSPITALIST

## 2019-03-31 RX ORDER — CEPHALEXIN 500 MG/1
500 CAPSULE ORAL EVERY 12 HOURS SCHEDULED
Qty: 12 CAPSULE | Refills: 0 | Status: SHIPPED | OUTPATIENT
Start: 2019-03-31 | End: 2019-04-06

## 2019-03-31 RX ORDER — TAMSULOSIN HYDROCHLORIDE 0.4 MG/1
0.4 CAPSULE ORAL
Qty: 30 CAPSULE | Refills: 0 | Status: SHIPPED | OUTPATIENT
Start: 2019-03-31 | End: 2019-04-09

## 2019-03-31 RX ORDER — TAMSULOSIN HYDROCHLORIDE 0.4 MG/1
0.4 CAPSULE ORAL
Status: DISCONTINUED | OUTPATIENT
Start: 2019-03-31 | End: 2019-03-31 | Stop reason: HOSPADM

## 2019-03-31 RX ADMIN — TAMSULOSIN HYDROCHLORIDE 0.4 MG: 0.4 CAPSULE ORAL at 13:21

## 2019-03-31 RX ADMIN — ENOXAPARIN SODIUM 40 MG: 40 INJECTION SUBCUTANEOUS at 08:27

## 2019-03-31 RX ADMIN — SODIUM CHLORIDE 125 ML/HR: 0.9 INJECTION, SOLUTION INTRAVENOUS at 06:35

## 2019-04-09 ENCOUNTER — HOSPITAL ENCOUNTER (EMERGENCY)
Facility: HOSPITAL | Age: 74
Discharge: HOME | End: 2019-04-09
Attending: EMERGENCY MEDICINE | Admitting: EMERGENCY MEDICINE
Payer: MEDICARE

## 2019-04-09 ENCOUNTER — APPOINTMENT (EMERGENCY)
Dept: CT IMAGING | Facility: HOSPITAL | Age: 74
End: 2019-04-09
Payer: MEDICARE

## 2019-04-09 ENCOUNTER — TELEPHONE (OUTPATIENT)
Dept: UROLOGY | Facility: MEDICAL CENTER | Age: 74
End: 2019-04-09

## 2019-04-09 VITALS
DIASTOLIC BLOOD PRESSURE: 72 MMHG | RESPIRATION RATE: 20 BRPM | OXYGEN SATURATION: 98 % | TEMPERATURE: 97.6 F | SYSTOLIC BLOOD PRESSURE: 175 MMHG | WEIGHT: 192.24 LBS | BODY MASS INDEX: 34.05 KG/M2 | HEART RATE: 78 BPM

## 2019-04-09 DIAGNOSIS — N20.1 RIGHT URETERAL STONE: Primary | ICD-10-CM

## 2019-04-09 LAB
ALBUMIN SERPL BCP-MCNC: 3.4 G/DL (ref 3.5–5)
ALP SERPL-CCNC: 143 U/L (ref 46–116)
ALT SERPL W P-5'-P-CCNC: 32 U/L (ref 12–78)
AMORPH PHOS CRY URNS QL MICRO: ABNORMAL /HPF
ANION GAP SERPL CALCULATED.3IONS-SCNC: 7 MMOL/L (ref 4–13)
AST SERPL W P-5'-P-CCNC: 30 U/L (ref 5–45)
BACTERIA UR QL AUTO: ABNORMAL /HPF
BASOPHILS # BLD AUTO: 0.01 THOUSANDS/ΜL (ref 0–0.1)
BASOPHILS NFR BLD AUTO: 0 % (ref 0–1)
BILIRUB SERPL-MCNC: 0.6 MG/DL (ref 0.2–1)
BILIRUB UR QL STRIP: NEGATIVE
BUN SERPL-MCNC: 20 MG/DL (ref 5–25)
CALCIUM SERPL-MCNC: 10.5 MG/DL (ref 8.3–10.1)
CHLORIDE SERPL-SCNC: 105 MMOL/L (ref 100–108)
CLARITY UR: CLEAR
CO2 SERPL-SCNC: 28 MMOL/L (ref 21–32)
COLOR UR: YELLOW
CREAT SERPL-MCNC: 0.92 MG/DL (ref 0.6–1.3)
EOSINOPHIL # BLD AUTO: 0.05 THOUSAND/ΜL (ref 0–0.61)
EOSINOPHIL NFR BLD AUTO: 1 % (ref 0–6)
ERYTHROCYTE [DISTWIDTH] IN BLOOD BY AUTOMATED COUNT: 12.3 % (ref 11.6–15.1)
GFR SERPL CREATININE-BSD FRML MDRD: 62 ML/MIN/1.73SQ M
GLUCOSE SERPL-MCNC: 97 MG/DL (ref 65–140)
GLUCOSE UR STRIP-MCNC: NEGATIVE MG/DL
HCT VFR BLD AUTO: 42.1 % (ref 34.8–46.1)
HGB BLD-MCNC: 13.8 G/DL (ref 11.5–15.4)
HGB UR QL STRIP.AUTO: ABNORMAL
IMM GRANULOCYTES # BLD AUTO: 0.02 THOUSAND/UL (ref 0–0.2)
IMM GRANULOCYTES NFR BLD AUTO: 0 % (ref 0–2)
KETONES UR STRIP-MCNC: NEGATIVE MG/DL
LEUKOCYTE ESTERASE UR QL STRIP: ABNORMAL
LIPASE SERPL-CCNC: 65 U/L (ref 73–393)
LYMPHOCYTES # BLD AUTO: 0.78 THOUSANDS/ΜL (ref 0.6–4.47)
LYMPHOCYTES NFR BLD AUTO: 12 % (ref 14–44)
MCH RBC QN AUTO: 29.1 PG (ref 26.8–34.3)
MCHC RBC AUTO-ENTMCNC: 32.8 G/DL (ref 31.4–37.4)
MCV RBC AUTO: 89 FL (ref 82–98)
MONOCYTES # BLD AUTO: 0.53 THOUSAND/ΜL (ref 0.17–1.22)
MONOCYTES NFR BLD AUTO: 8 % (ref 4–12)
NEUTROPHILS # BLD AUTO: 4.99 THOUSANDS/ΜL (ref 1.85–7.62)
NEUTS SEG NFR BLD AUTO: 79 % (ref 43–75)
NITRITE UR QL STRIP: NEGATIVE
NON-SQ EPI CELLS URNS QL MICRO: ABNORMAL /HPF
NRBC BLD AUTO-RTO: 0 /100 WBCS
PH UR STRIP.AUTO: 8.5 [PH] (ref 4.5–8)
PLATELET # BLD AUTO: 242 THOUSANDS/UL (ref 149–390)
PMV BLD AUTO: 9.3 FL (ref 8.9–12.7)
POTASSIUM SERPL-SCNC: 4.1 MMOL/L (ref 3.5–5.3)
PROT SERPL-MCNC: 7.2 G/DL (ref 6.4–8.2)
PROT UR STRIP-MCNC: NEGATIVE MG/DL
RBC # BLD AUTO: 4.75 MILLION/UL (ref 3.81–5.12)
RBC #/AREA URNS AUTO: ABNORMAL /HPF
SODIUM SERPL-SCNC: 140 MMOL/L (ref 136–145)
SP GR UR STRIP.AUTO: 1.02 (ref 1–1.03)
UROBILINOGEN UR QL STRIP.AUTO: 0.2 E.U./DL
WBC # BLD AUTO: 6.38 THOUSAND/UL (ref 4.31–10.16)
WBC #/AREA URNS AUTO: ABNORMAL /HPF

## 2019-04-09 PROCEDURE — 96375 TX/PRO/DX INJ NEW DRUG ADDON: CPT

## 2019-04-09 PROCEDURE — 36415 COLL VENOUS BLD VENIPUNCTURE: CPT | Performed by: EMERGENCY MEDICINE

## 2019-04-09 PROCEDURE — 99284 EMERGENCY DEPT VISIT MOD MDM: CPT

## 2019-04-09 PROCEDURE — 96376 TX/PRO/DX INJ SAME DRUG ADON: CPT

## 2019-04-09 PROCEDURE — 83690 ASSAY OF LIPASE: CPT | Performed by: EMERGENCY MEDICINE

## 2019-04-09 PROCEDURE — 96374 THER/PROPH/DIAG INJ IV PUSH: CPT

## 2019-04-09 PROCEDURE — 85025 COMPLETE CBC W/AUTO DIFF WBC: CPT | Performed by: EMERGENCY MEDICINE

## 2019-04-09 PROCEDURE — 81001 URINALYSIS AUTO W/SCOPE: CPT

## 2019-04-09 PROCEDURE — 80053 COMPREHEN METABOLIC PANEL: CPT | Performed by: EMERGENCY MEDICINE

## 2019-04-09 PROCEDURE — 96361 HYDRATE IV INFUSION ADD-ON: CPT

## 2019-04-09 PROCEDURE — 74176 CT ABD & PELVIS W/O CONTRAST: CPT

## 2019-04-09 PROCEDURE — 99284 EMERGENCY DEPT VISIT MOD MDM: CPT | Performed by: EMERGENCY MEDICINE

## 2019-04-09 RX ORDER — MORPHINE SULFATE 4 MG/ML
4 INJECTION, SOLUTION INTRAMUSCULAR; INTRAVENOUS ONCE
Status: COMPLETED | OUTPATIENT
Start: 2019-04-09 | End: 2019-04-09

## 2019-04-09 RX ORDER — OXYCODONE HYDROCHLORIDE AND ACETAMINOPHEN 5; 325 MG/1; MG/1
1 TABLET ORAL EVERY 4 HOURS PRN
Qty: 10 TABLET | Refills: 0 | Status: SHIPPED | OUTPATIENT
Start: 2019-04-09 | End: 2019-04-09 | Stop reason: SDUPTHER

## 2019-04-09 RX ORDER — ONDANSETRON 2 MG/ML
4 INJECTION INTRAMUSCULAR; INTRAVENOUS ONCE
Status: COMPLETED | OUTPATIENT
Start: 2019-04-09 | End: 2019-04-09

## 2019-04-09 RX ORDER — ONDANSETRON 4 MG/1
4 TABLET, ORALLY DISINTEGRATING ORAL EVERY 8 HOURS PRN
Qty: 10 TABLET | Refills: 0 | Status: SHIPPED | OUTPATIENT
Start: 2019-04-09 | End: 2019-05-13

## 2019-04-09 RX ORDER — KETOROLAC TROMETHAMINE 30 MG/ML
15 INJECTION, SOLUTION INTRAMUSCULAR; INTRAVENOUS ONCE
Status: COMPLETED | OUTPATIENT
Start: 2019-04-09 | End: 2019-04-09

## 2019-04-09 RX ORDER — MELATONIN
1000 DAILY
COMMUNITY

## 2019-04-09 RX ORDER — ONDANSETRON 4 MG/1
4 TABLET, ORALLY DISINTEGRATING ORAL EVERY 8 HOURS PRN
Qty: 10 TABLET | Refills: 0 | Status: SHIPPED | OUTPATIENT
Start: 2019-04-09 | End: 2019-04-09 | Stop reason: SDUPTHER

## 2019-04-09 RX ORDER — OXYCODONE HYDROCHLORIDE AND ACETAMINOPHEN 5; 325 MG/1; MG/1
1 TABLET ORAL EVERY 4 HOURS PRN
Qty: 10 TABLET | Refills: 0 | Status: SHIPPED | OUTPATIENT
Start: 2019-04-09 | End: 2019-04-11 | Stop reason: SDUPTHER

## 2019-04-09 RX ADMIN — MORPHINE SULFATE 4 MG: 4 INJECTION INTRAVENOUS at 19:21

## 2019-04-09 RX ADMIN — KETOROLAC TROMETHAMINE 15 MG: 30 INJECTION, SOLUTION INTRAMUSCULAR; INTRAVENOUS at 20:36

## 2019-04-09 RX ADMIN — MORPHINE SULFATE 4 MG: 4 INJECTION INTRAVENOUS at 18:39

## 2019-04-09 RX ADMIN — SODIUM CHLORIDE 1000 ML: 0.9 INJECTION, SOLUTION INTRAVENOUS at 18:40

## 2019-04-09 RX ADMIN — ONDANSETRON 4 MG: 2 INJECTION INTRAMUSCULAR; INTRAVENOUS at 18:40

## 2019-04-10 ENCOUNTER — TELEPHONE (OUTPATIENT)
Dept: UROLOGY | Facility: AMBULATORY SURGERY CENTER | Age: 74
End: 2019-04-10

## 2019-04-10 ENCOUNTER — OFFICE VISIT (OUTPATIENT)
Dept: UROLOGY | Facility: MEDICAL CENTER | Age: 74
End: 2019-04-10
Payer: MEDICARE

## 2019-04-10 VITALS
HEIGHT: 63 IN | WEIGHT: 190 LBS | DIASTOLIC BLOOD PRESSURE: 70 MMHG | SYSTOLIC BLOOD PRESSURE: 120 MMHG | BODY MASS INDEX: 33.66 KG/M2 | HEART RATE: 63 BPM

## 2019-04-10 DIAGNOSIS — N20.1 CALCULUS OF URETER: Primary | ICD-10-CM

## 2019-04-10 DIAGNOSIS — N20.1 RIGHT URETERAL STONE: ICD-10-CM

## 2019-04-10 DIAGNOSIS — E83.52 HYPERCALCEMIA: ICD-10-CM

## 2019-04-10 PROCEDURE — 99214 OFFICE O/P EST MOD 30 MIN: CPT | Performed by: UROLOGY

## 2019-04-10 RX ORDER — OXYCODONE HYDROCHLORIDE AND ACETAMINOPHEN 5; 325 MG/1; MG/1
1 TABLET ORAL EVERY 4 HOURS PRN
Qty: 15 TABLET | Refills: 0 | Status: SHIPPED | OUTPATIENT
Start: 2019-04-10 | End: 2019-04-15 | Stop reason: SDUPTHER

## 2019-04-11 RX ORDER — OXYCODONE HYDROCHLORIDE AND ACETAMINOPHEN 5; 325 MG/1; MG/1
1 TABLET ORAL EVERY 4 HOURS PRN
Qty: 10 TABLET | Refills: 0 | Status: SHIPPED | OUTPATIENT
Start: 2019-04-11 | End: 2019-04-21

## 2019-04-15 DIAGNOSIS — N20.1 RIGHT URETERAL STONE: ICD-10-CM

## 2019-04-15 RX ORDER — OXYCODONE HYDROCHLORIDE AND ACETAMINOPHEN 5; 325 MG/1; MG/1
1 TABLET ORAL EVERY 4 HOURS PRN
Qty: 10 TABLET | Refills: 0 | OUTPATIENT
Start: 2019-04-15 | End: 2019-04-25

## 2019-04-17 ENCOUNTER — TRANSCRIBE ORDERS (OUTPATIENT)
Dept: ADMINISTRATIVE | Facility: HOSPITAL | Age: 74
End: 2019-04-17

## 2019-04-17 DIAGNOSIS — Z80.9 FAMILY HISTORY OF UNSPECIFIED MALIGNANT NEOPLASM: ICD-10-CM

## 2019-04-17 DIAGNOSIS — E78.2 MIXED HYPERLIPIDEMIA: Primary | ICD-10-CM

## 2019-04-17 DIAGNOSIS — Z72.89 OTHER PROBLEMS RELATED TO LIFESTYLE: ICD-10-CM

## 2019-04-17 DIAGNOSIS — E55.9 VITAMIN D DEFICIENCY: ICD-10-CM

## 2019-04-19 ENCOUNTER — LAB (OUTPATIENT)
Dept: LAB | Facility: CLINIC | Age: 74
End: 2019-04-19
Payer: MEDICARE

## 2019-04-23 ENCOUNTER — TELEPHONE (OUTPATIENT)
Dept: UROLOGY | Facility: MEDICAL CENTER | Age: 74
End: 2019-04-23

## 2019-04-24 ENCOUNTER — TRANSCRIBE ORDERS (OUTPATIENT)
Dept: LAB | Facility: CLINIC | Age: 74
End: 2019-04-24

## 2019-05-13 ENCOUNTER — OFFICE VISIT (OUTPATIENT)
Dept: UROLOGY | Facility: MEDICAL CENTER | Age: 74
End: 2019-05-13
Payer: MEDICARE

## 2019-05-13 VITALS
SYSTOLIC BLOOD PRESSURE: 122 MMHG | BODY MASS INDEX: 32.27 KG/M2 | DIASTOLIC BLOOD PRESSURE: 70 MMHG | HEIGHT: 64 IN | WEIGHT: 189 LBS

## 2019-05-13 DIAGNOSIS — E21.3 HYPERPARATHYROIDISM (HCC): ICD-10-CM

## 2019-05-13 DIAGNOSIS — N20.1 CALCULUS OF URETER: Primary | ICD-10-CM

## 2019-05-13 LAB
SL AMB  POCT GLUCOSE, UA: ABNORMAL
SL AMB LEUKOCYTE ESTERASE,UA: ABNORMAL
SL AMB POCT BILIRUBIN,UA: ABNORMAL
SL AMB POCT BLOOD,UA: ABNORMAL
SL AMB POCT CLARITY,UA: CLEAR
SL AMB POCT COLOR,UA: YELLOW
SL AMB POCT KETONES,UA: ABNORMAL
SL AMB POCT NITRITE,UA: ABNORMAL
SL AMB POCT PH,UA: 5.5
SL AMB POCT SPECIFIC GRAVITY,UA: 1.03
SL AMB POCT URINE PROTEIN: ABNORMAL
SL AMB POCT UROBILINOGEN: 0.2

## 2019-05-13 PROCEDURE — 81003 URINALYSIS AUTO W/O SCOPE: CPT | Performed by: UROLOGY

## 2019-05-13 PROCEDURE — 99214 OFFICE O/P EST MOD 30 MIN: CPT | Performed by: UROLOGY

## 2019-07-17 ENCOUNTER — LAB (OUTPATIENT)
Dept: LAB | Facility: CLINIC | Age: 74
End: 2019-07-17
Payer: MEDICARE

## 2019-07-17 ENCOUNTER — CONSULT (OUTPATIENT)
Dept: ENDOCRINOLOGY | Facility: CLINIC | Age: 74
End: 2019-07-17
Payer: MEDICARE

## 2019-07-17 VITALS
HEIGHT: 64 IN | DIASTOLIC BLOOD PRESSURE: 82 MMHG | HEART RATE: 80 BPM | SYSTOLIC BLOOD PRESSURE: 140 MMHG | BODY MASS INDEX: 32.27 KG/M2 | WEIGHT: 189 LBS

## 2019-07-17 DIAGNOSIS — N20.1 CALCULUS OF URETER: ICD-10-CM

## 2019-07-17 DIAGNOSIS — E83.52 HYPERCALCEMIA: Primary | ICD-10-CM

## 2019-07-17 DIAGNOSIS — E21.3 HYPERPARATHYROIDISM (HCC): ICD-10-CM

## 2019-07-17 LAB
ALBUMIN SERPL BCP-MCNC: 3.4 G/DL (ref 3.5–5)
CALCIUM SERPL-MCNC: 9.7 MG/DL (ref 8.3–10.1)
PHOSPHATE SERPL-MCNC: 3.1 MG/DL (ref 2.3–4.1)
PTH-INTACT SERPL-MCNC: 107.7 PG/ML (ref 18.4–80.1)

## 2019-07-17 PROCEDURE — 83970 ASSAY OF PARATHORMONE: CPT

## 2019-07-17 PROCEDURE — 36415 COLL VENOUS BLD VENIPUNCTURE: CPT

## 2019-07-17 PROCEDURE — 82040 ASSAY OF SERUM ALBUMIN: CPT

## 2019-07-17 PROCEDURE — 1124F ACP DISCUSS-NO DSCNMKR DOCD: CPT | Performed by: INTERNAL MEDICINE

## 2019-07-17 PROCEDURE — 99204 OFFICE O/P NEW MOD 45 MIN: CPT | Performed by: INTERNAL MEDICINE

## 2019-07-17 PROCEDURE — 84100 ASSAY OF PHOSPHORUS: CPT

## 2019-07-17 PROCEDURE — 82310 ASSAY OF CALCIUM: CPT

## 2019-07-17 RX ORDER — MELOXICAM 7.5 MG/1
TABLET ORAL
COMMUNITY
Start: 2019-07-11 | End: 2019-10-01

## 2019-07-17 NOTE — ASSESSMENT & PLAN NOTE
She likely has primary hyperparathyroidism with mild elevation in calcium and an increase in parathyroid level  This may be the etiology of her nephrolithiasis at this time  Since the last testing was done three months ago, check calcium, albumin, phosphorus and intact PTH  She does have an order for stone profile which includes a 24 urine calcium  Await the results of these  She may require a sestamibi scan if the laboratory testing confirms primary hyperparathyroidism

## 2019-07-17 NOTE — PROGRESS NOTES
Assessment/Plan:    Hyperparathyroidism ([facility] )  She likely has primary hyperparathyroidism with mild elevation in calcium and an increase in parathyroid level  This may be the etiology of her nephrolithiasis at this time  Since the last testing was done three months ago, check calcium, albumin, phosphorus and intact PTH  She does have an order for stone profile which includes a 24 urine calcium  Await the results of these  She may require a sestamibi scan if the laboratory testing confirms primary hyperparathyroidism  Hypercalcemia  This is likely due to primary hyperparathyroidism  Calculus of ureter  This may be due to primary hyperparathyroidism  Diagnoses and all orders for this visit:    Hypercalcemia    Hyperparathyroidism ([facility] )  -     Ambulatory referral to Endocrinology  -     Albumin Lab Collect; Future  -     Calcium Lab Collect; Future  -     Phosphorus Lab Collect; Future  -     PTH, intact Lab Collect Lab Collect; Future    Calculus of ureter    Other orders  -     meloxicam (MOBIC) 7 5 mg tablet          Subjective:      Patient ID: Marietta Pal is a 69 y o  female  80-year-old woman who had nephrolithiasis several months ago  In the workup of this, she was noted to have vitamin-D deficiency and hyperparathyroidism  Further testing revealed mild hypercalcemia  She denies any family history of MEN one  She denies any polyuria, polydipsia, nocturia but admits to chronic constipation which has not worsened  She denies any memory difficulties that are new  The following portions of the patient's history were reviewed and updated as appropriate: allergies, current medications, past family history, past medical history, past social history, past surgical history and problem list     Review of Systems   Constitutional: Negative for chills and fever  Respiratory: Negative for shortness of breath  Cardiovascular: Negative for chest pain     Gastrointestinal: Negative for constipation, diarrhea, nausea and vomiting  Endocrine: Negative for polydipsia and polyuria  All other systems reviewed and are negative  Objective:      /82   Pulse 80   Ht 5' 3 5" (1 613 m)   Wt 85 7 kg (189 lb)   BMI 32 95 kg/m²          Physical Exam   Constitutional: She is oriented to person, place, and time  She appears well-developed and well-nourished  No distress  HENT:   Head: Normocephalic and atraumatic  Mouth/Throat: Oropharynx is clear and moist and mucous membranes are normal  No oropharyngeal exudate  Eyes: Conjunctivae, EOM and lids are normal  Right eye exhibits no discharge  Left eye exhibits no discharge  No scleral icterus  Neck: Neck supple  No thyromegaly present  Cardiovascular: Normal rate, regular rhythm and normal heart sounds  Exam reveals no gallop and no friction rub  No murmur heard  Pulmonary/Chest: Effort normal and breath sounds normal  No respiratory distress  She has no wheezes  Abdominal: Soft  Bowel sounds are normal  She exhibits no distension  There is no tenderness  Musculoskeletal: Normal range of motion  She exhibits no edema, tenderness or deformity  Lymphadenopathy:        Head (right side): No occipital adenopathy present  Head (left side): No occipital adenopathy present  Right: No supraclavicular adenopathy present  Left: No supraclavicular adenopathy present  Neurological: She is alert and oriented to person, place, and time  No cranial nerve deficit  Skin: Skin is warm and intact  No rash noted  She is not diaphoretic  No erythema  Psychiatric: She has a normal mood and affect  Her behavior is normal    Vitals reviewed

## 2019-07-17 NOTE — LETTER
July 17, 2019     Jose Cabrera, [address] [address]    Patient: Lang Moraes   YOB: 1945   Date of Visit: 7/17/2019       Dear Dr Shaunna Ramirez: Thank you for referring Germaine Leslie to me for evaluation  Below are my notes for this consultation  If you have questions, please do not hesitate to call me  I look forward to following your patient along with you  Sincerely,        Keenan Solitario MD        CC: MD Keenan Orta MD  7/17/2019  9:34 AM  Sign at close encounter  Assessment/Plan:    Hyperparathyroidism [facility])  She likely has primary hyperparathyroidism with mild elevation in calcium and an increase in parathyroid level  This may be the etiology of her nephrolithiasis at this time  Since the last testing was done three months ago, check calcium, albumin, phosphorus and intact PTH  She does have an order for stone profile which includes a 24 urine calcium  Await the results of these  She may require a sestamibi scan if the laboratory testing confirms primary hyperparathyroidism  Hypercalcemia  This is likely due to primary hyperparathyroidism  Calculus of ureter  This may be due to primary hyperparathyroidism  Diagnoses and all orders for this visit:    Hypercalcemia    Hyperparathyroidism ([facility] )  -     Ambulatory referral to Endocrinology  -     Albumin Lab Collect; Future  -     Calcium Lab Collect; Future  -     Phosphorus Lab Collect; Future  -     PTH, intact Lab Collect Lab Collect; Future    Calculus of ureter    Other orders  -     meloxicam (MOBIC) 7 5 mg tablet          Subjective:      Patient ID: Lang Moraes is a 69 y o  female  71-year-old woman who had nephrolithiasis several months ago  In the workup of this, she was noted to have vitamin-D deficiency and hyperparathyroidism  Further testing revealed mild hypercalcemia  She denies any family history of MEN one    She denies any polyuria, polydipsia, nocturia but admits to chronic constipation which has not worsened  She denies any memory difficulties that are new  The following portions of the patient's history were reviewed and updated as appropriate: allergies, current medications, past family history, past medical history, past social history, past surgical history and problem list     Review of Systems   Constitutional: Negative for chills and fever  Respiratory: Negative for shortness of breath  Cardiovascular: Negative for chest pain  Gastrointestinal: Negative for constipation, diarrhea, nausea and vomiting  Endocrine: Negative for polydipsia and polyuria  All other systems reviewed and are negative  Objective:      /82   Pulse 80   Ht 5' 3 5" (1 613 m)   Wt 85 7 kg (189 lb)   BMI 32 95 kg/m²           Physical Exam   Constitutional: She is oriented to person, place, and time  She appears well-developed and well-nourished  No distress  HENT:   Head: Normocephalic and atraumatic  Mouth/Throat: Oropharynx is clear and moist and mucous membranes are normal  No oropharyngeal exudate  Eyes: Conjunctivae, EOM and lids are normal  Right eye exhibits no discharge  Left eye exhibits no discharge  No scleral icterus  Neck: Neck supple  No thyromegaly present  Cardiovascular: Normal rate, regular rhythm and normal heart sounds  Exam reveals no gallop and no friction rub  No murmur heard  Pulmonary/Chest: Effort normal and breath sounds normal  No respiratory distress  She has no wheezes  Abdominal: Soft  Bowel sounds are normal  She exhibits no distension  There is no tenderness  Musculoskeletal: Normal range of motion  She exhibits no edema, tenderness or deformity  Lymphadenopathy:        Head (right side): No occipital adenopathy present  Head (left side): No occipital adenopathy present  Right: No supraclavicular adenopathy present  Left: No supraclavicular adenopathy present  Neurological: She is alert and oriented to person, place, and time  No cranial nerve deficit  Skin: Skin is warm and intact  No rash noted  She is not diaphoretic  No erythema  Psychiatric: She has a normal mood and affect  Her behavior is normal    Vitals reviewed

## 2019-07-18 NOTE — RESULT ENCOUNTER NOTE
Please call the patient regarding her abnormal result  Likely has primary hyperparathyroidism  Await 24 urine testing  Recommend sestamibi scan

## 2019-07-22 ENCOUNTER — LAB (OUTPATIENT)
Dept: LAB | Facility: CLINIC | Age: 74
End: 2019-07-22
Payer: MEDICARE

## 2019-07-22 ENCOUNTER — TELEPHONE (OUTPATIENT)
Dept: ENDOCRINOLOGY | Facility: CLINIC | Age: 74
End: 2019-07-22

## 2019-07-22 ENCOUNTER — TRANSCRIBE ORDERS (OUTPATIENT)
Dept: ADMINISTRATIVE | Facility: HOSPITAL | Age: 74
End: 2019-07-22

## 2019-07-22 DIAGNOSIS — E21.3 HYPERPARATHYROIDISM (HCC): Primary | ICD-10-CM

## 2019-07-22 DIAGNOSIS — E21.3 HYPERPARATHYROIDISM (HCC): ICD-10-CM

## 2019-07-22 LAB
CALCIUM 24H UR-MCNC: 301.2 MG/24 HRS (ref 42–353)
SPECIMEN VOL UR: 1200 ML

## 2019-07-22 PROCEDURE — 82340 ASSAY OF CALCIUM IN URINE: CPT

## 2019-07-22 NOTE — TELEPHONE ENCOUNTER
----- Message from Nya Hyde MD sent at 7/18/2019  8:31 AM EDT -----  Please call the patient regarding her abnormal result  Likely has primary hyperparathyroidism  Await 24 urine testing  Recommend sestamibi scan

## 2019-07-22 NOTE — TELEPHONE ENCOUNTER
----- Message from Tally Leventhal, MD sent at 7/18/2019  8:31 AM EDT -----  Please call the patient regarding her abnormal result  Likely has primary hyperparathyroidism  Await 24 urine testing  Recommend sestamibi scan

## 2019-07-26 ENCOUNTER — HOSPITAL ENCOUNTER (OUTPATIENT)
Dept: NUCLEAR MEDICINE | Facility: HOSPITAL | Age: 74
Discharge: HOME | End: 2019-07-26
Attending: INTERNAL MEDICINE
Payer: MEDICARE

## 2019-07-26 DIAGNOSIS — E21.3 HYPERPARATHYROIDISM (HCC): ICD-10-CM

## 2019-07-26 PROCEDURE — 78071 PARATHYRD PLANAR W/WO SUBTRJ: CPT

## 2019-07-26 PROCEDURE — A9500 TC99M SESTAMIBI: HCPCS

## 2019-07-29 ENCOUNTER — TELEPHONE (OUTPATIENT)
Dept: ENDOCRINOLOGY | Facility: CLINIC | Age: 74
End: 2019-07-29

## 2019-07-29 NOTE — TELEPHONE ENCOUNTER
Left message for pt to make an appt with Dr Geetha Ulloa for her abnormal NM scan    I gave her the number  [phone]

## 2019-07-29 NOTE — TELEPHONE ENCOUNTER
----- Message from Rosmery Jorgensen MD sent at 7/29/2019  8:41 AM EDT -----  The laboratory testing results are normal

## 2019-07-29 NOTE — TELEPHONE ENCOUNTER
----- Message from Dominik Figueroa MD sent at 7/29/2019  8:25 AM EDT -----  Please call the patient regarding her abnormal result  There is likely a parathyroid abnormality  Would recommend seeing Dr Kristan Theodore

## 2019-07-29 NOTE — RESULT ENCOUNTER NOTE
Please call the patient regarding her abnormal result  There is likely a parathyroid abnormality  Would recommend seeing Dr Renay Coelho

## 2019-08-08 ENCOUNTER — TELEPHONE (OUTPATIENT)
Dept: ENDOCRINOLOGY | Facility: CLINIC | Age: 74
End: 2019-08-08

## 2019-08-08 NOTE — TELEPHONE ENCOUNTER
Pt called and stated that she saw her PCP who looked at her NM scan and saw that they suggested a thyroid US  Pt is wondering if she could get the [address] prior to her appt with Dr Angulo Minus later this month   Please advise

## 2019-08-22 PROBLEM — R79.89 HIGH SERUM PARATHYROID HORMONE (PTH): Status: ACTIVE | Noted: 2019-08-22

## 2019-08-26 ENCOUNTER — CONSULT (OUTPATIENT)
Dept: SURGICAL ONCOLOGY | Facility: CLINIC | Age: 74
End: 2019-08-26
Payer: MEDICARE

## 2019-08-26 VITALS
DIASTOLIC BLOOD PRESSURE: 82 MMHG | BODY MASS INDEX: 32.95 KG/M2 | HEART RATE: 71 BPM | SYSTOLIC BLOOD PRESSURE: 132 MMHG | RESPIRATION RATE: 16 BRPM | HEIGHT: 64 IN | TEMPERATURE: 98.2 F

## 2019-08-26 DIAGNOSIS — E21.3 HYPERPARATHYROIDISM (HCC): Primary | ICD-10-CM

## 2019-08-26 DIAGNOSIS — R79.89 HIGH SERUM PARATHYROID HORMONE (PTH): ICD-10-CM

## 2019-08-26 DIAGNOSIS — E83.52 HYPERCALCEMIA: ICD-10-CM

## 2019-08-26 PROCEDURE — 99203 OFFICE O/P NEW LOW 30 MIN: CPT | Performed by: SURGERY

## 2019-08-26 NOTE — PROGRESS NOTES
Surgical Oncology Follow Up       [address] [facility] [place]  [address] [facility] [address]  [phone]    Isabell Bruce  1945  [id number]  [address] [facility] [place]  [address] [facility] [id number] [phone]    Chief Complaint   Patient presents with    Consult     Hyperparathyroidism  Assessment/Plan:    No problem-specific Assessment & Plan notes found for this encounter  Diagnoses and all orders for this visit:    Hyperparathyroidism ([facility] )  -     Case request operating room: PARATHYROIDECTOMY, MINIMALLY INVASIVE, right, MONITORING INTRAOPERATIVE PTH (PARATHYROID HORMONE); Standing  -     Comprehensive metabolic panel; Future  -     CBC and differential; Future  -     EKG 12 lead; Future  -     XR chest pa & lateral; Future  -     Case request operating room: PARATHYROIDECTOMY, MINIMALLY INVASIVE, right, MONITORING INTRAOPERATIVE PTH (PARATHYROID HORMONE)    Hypercalcemia  -     Case request operating room: PARATHYROIDECTOMY, MINIMALLY INVASIVE, right, MONITORING INTRAOPERATIVE PTH (PARATHYROID HORMONE); Standing  -     Comprehensive metabolic panel; Future  -     CBC and differential; Future  -     EKG 12 lead; Future  -     XR chest pa & lateral; Future  -     Case request operating room: PARATHYROIDECTOMY, MINIMALLY INVASIVE, right, MONITORING INTRAOPERATIVE PTH (PARATHYROID HORMONE)    High serum parathyroid hormone (PTH)  -     Case request operating room: PARATHYROIDECTOMY, MINIMALLY INVASIVE, right, MONITORING INTRAOPERATIVE PTH (PARATHYROID HORMONE); Standing  -     Comprehensive metabolic panel; Future  -     CBC and differential; Future  -     EKG 12 lead;  Future  -     XR chest pa & lateral; Future  -     Case request operating room: PARATHYROIDECTOMY, MINIMALLY INVASIVE, right, MONITORING INTRAOPERATIVE PTH (PARATHYROID HORMONE)    Other orders  -     Incentive spirometry; Standing  -     Insert and maintain IV line; Standing  -     Void On-Call to O R ; Standing  -     Place sequential compression device; Standing        Advance Care Planning/Advance Directives:  Discussed disease status, cancer treatment plans and/or cancer treatment goals with the patient  No history exists  History of Present Illness:  71-year-old woman with what appears to be primary hyperparathyroidism  She has had several decades of kidney stones, with most recent attacks being earlier this spring  She underwent workup including calcium, PTH, and ultimately sestamibi check which revealed what appears to be an area of uptake in the right neck  She has been referred for evaluation and treatment  She complains of fatigue, depressed mood, reflux, abdominal pain/constipation, muscle aches and pains  Review of Systems   Constitutional: Positive for fatigue  HENT: Negative  Eyes: Negative  Respiratory: Negative  Cardiovascular: Negative  Gastrointestinal: Positive for abdominal pain  Endocrine: Negative  Genitourinary: Negative  Musculoskeletal: Positive for arthralgias, back pain, gait problem and myalgias  Skin: Negative  Allergic/Immunologic: Negative  Hematological: Negative  Psychiatric/Behavioral: Positive for decreased concentration  Issues with memory and mood         Patient Active Problem List   Diagnosis    UTI (urinary tract infection)    Hydronephrosis    Accelerated hypertension    Calculus of ureter    Hypercalcemia    Hyperparathyroidism (HCC)    High serum parathyroid hormone (PTH)     Past Medical History:   Diagnosis Date    Kidney stones      Past Surgical History:   Procedure Laterality Date    BACK SURGERY      FOOT SURGERY       No family history on file    Social History     Socioeconomic History    Marital status: /Civil Union     Spouse name: Not on file    Number of children: Not on file    Years of education: Not on file    Highest education level: Not on file   Occupational History    Occupation: advisor   part time  [address] resource strain: Not on file    Food insecurity:     Worry: Not on file     Inability: Not on file    Transportation needs:     Medical: Not on file     Non-medical: Not on file   Tobacco Use    Smoking status: Never Smoker    Smokeless tobacco: Never Used   Substance and Sexual Activity    Alcohol use: Not Currently    Drug use: No    Sexual activity: Not on file   Lifestyle    Physical activity:     Days per week: Not on file     Minutes per session: Not on file    Stress: Not on file   Relationships    Social connections:     Talks on phone: Not on file     Gets together: Not on file     Attends [employer] service: Not on file     Active member of club or organization: Not on file     Attends meetings of clubs or organizations: Not on file     Relationship status: Not on file    Intimate partner violence:     Fear of current or ex partner: Not on file     Emotionally abused: Not on file     Physically abused: Not on file     Forced sexual activity: Not on file   Other Topics Concern    Not on file   Social History Narrative    Not on file       Current Outpatient Medications:     cholecalciferol (VITAMIN D3) 1,000 units tablet, Take 1,000 Units by mouth daily, Disp: , Rfl:     meloxicam (MOBIC) 7 5 mg tablet, , Disp: , Rfl:     Misc Natural Products (TURMERIC CURCUMIN) CAPS, Take by mouth, Disp: , Rfl:   Allergies   Allergen Reactions    Sulfa Antibiotics      Vitals:    08/26/19 1305   BP: 132/82   Pulse: 71   Resp: 16   Temp: 98 2 °F (36 8 °C)       Physical Exam   Constitutional: She is oriented to person, place, and time  She appears well-developed and well-nourished  HENT:   Head: Normocephalic and atraumatic     Right Ear: External ear normal    Left Ear: External ear normal    Eyes: Pupils are equal, round, and reactive to light  EOM are normal    Neck: Normal range of motion  Neck supple  No JVD present  No tracheal deviation present  No thyromegaly present  Cardiovascular: Normal rate, regular rhythm and normal heart sounds  Pulmonary/Chest: Effort normal and breath sounds normal    Abdominal: Soft  Bowel sounds are normal    Musculoskeletal: Normal range of motion  Lymphadenopathy:     She has no cervical adenopathy  Neurological: She is alert and oriented to person, place, and time  Skin: Skin is warm and dry  Psychiatric: She has a normal mood and affect  Her behavior is normal  Judgment and thought content normal        Pathology:  none    Labs:  CBC, Coags, BMP, Mg, Phos   Lab Results   Component Value Date     7 (H) 07/17/2019    CALCIUM 9 7 07/17/2019    PHOS 3 1 07/17/2019       Imaging  PARATHYROID SCAN     INDICATION:  E21 3: Hyperparathyroidism, unspecified     COMPARISON:  None      TECHNIQUE:   Following the intravenous administration of 27 1 mCi Tc-99m Cardiolite, anterior and bilateral anterior oblique projection images of the neck and mediastinum were obtained at approximately 10 minutes post injection followed at 2 hours post   injection by static anterior and bilateral oblique projections as well as SPECT images in coronal, sagittal and axial projections       FINDINGS:     Early images demonstrate radiotracer uptake in the thyroid gland bilaterally  This is more focal in the right lower pole region      Delayed images demonstrate washout of thyroid gland activity  Faint focal radiotracer uptake noted in the right lower pole region      SPECT images demonstrate slight focal radiotracer uptake in the right lower pole region  This may be in the thyroid gland  No additional suspicious foci of radiotracer uptake      IMPRESSION:     1  Mild focal radiotracer uptake in the right lower pole region    This could be related to a thyroid nodule rather than a parathyroid adenoma  This could be further evaluated beginning with thyroid ultrasound      Workstation performed: [id number]     I reviewed the above laboratory and imaging data  Discussion/Summary:  Hyperparathyroidism  Suspected right-sided adenoma  Patient is candidate for minimally invasive right parathyroidectomy, possible 4 gland exploration with intraoperative PTH monitoring  Rationale for this along with risks and benefits of surgery including infection, bleeding, need for possible additional surgery, discussed with patient  She understands the plan and wishes to proceed as outlined

## 2019-08-26 NOTE — PATIENT INSTRUCTIONS
Pre-Surgery Instructions:   Medication Instructions    cholecalciferol (VITAMIN D3) 1,000 units tablet Stop taking 1 days prior to surgery    meloxicam (MOBIC) 7 5 mg tablet Stop taking 1 days prior to surgery     Parathyroidectomy   WHAT YOU NEED TO KNOW:   A parathyroidectomy is surgery to remove part or all of your parathyroid glands  The parathyroid is made of 4 small glands that usually sit near the thyroid gland  The parathyroid glands make a hormone that controls the amount of calcium in your blood  DISCHARGE INSTRUCTIONS:   Medicines: You may need any of the following:  · NSAIDs  may decrease swelling and pain  This medicine can be bought with or without a doctor's order  This medicine can cause stomach bleeding or kidney problems in certain people  If you take blood thinner medicine, always ask your healthcare provider if NSAIDs are safe for you  Always read the medicine label and follow the directions on it before using this medicine  · Acetaminophen  decreases pain  It is available without a doctor's order  Ask how much to take and how often to take it  Follow directions  Acetaminophen can cause liver damage if not taken correctly  · Take your medicine as directed  Contact your healthcare provider if you think your medicine is not helping or if you have side effects  Tell him or her if you are allergic to any medicine  Keep a list of the medicines, vitamins, and herbs you take  Include the amounts, and when and why you take them  Bring the list or the pill bottles to follow-up visits  Carry your medicine list with you in case of an emergency  Follow up with your healthcare provider or surgeon as directed: You will need to return to have tests, your incision checked, and your drain or stitches removed  You may be referred to an endocrinologist  Write down your questions so you remember to ask them during your visits  Wound care:  Care for your wound as directed   You may need to carefully wash the wound with soap and water  Dry the area and put on new, clean bandages as directed  Change your bandages when they get wet or dirty  Check your drain when you change the bandages  Do not pull the drain out  Ask for more information about how to care for your drain  Rest as needed:  Slowly start to do more each day  Return to your daily activities as directed  Take supplements as directed: You may need to take calcium medicine to keep your blood calcium level normal  It may also help prevent and treat bone loss  Your healthcare provider may also tell you to take vitamin D to help your body absorb the calcium  Contact your healthcare provider or surgeon if:   · You have a fever  · Your wound is red, swollen, or draining pus  · You have pain in your neck area that does not go away, or gets worse even after you take your pain medicine  · You have chills, a cough, or feel weak and achy  · You have nausea or are vomiting  · You have questions or concerns about your condition or care  Seek care immediately or call 911 if:   · You cough up blood  · You feel lightheaded, short of breath, and have chest pain  · Your arm or leg feels warm, tender, and painful  It may look swollen and red  · You have trouble swallowing or talking, or you lose your voice  · You feel anxious, frightened, and uneasy  · You have the following symptoms of low blood calcium:     ¨ Confusion    ¨ Fatigue    ¨ Muscle spasms or muscle tightening    ¨ Numbness or tingling around your face, hands, or feet    ¨ A seizure  © 2017 [address]  Information is for End User's use only and may not be sold, redistributed or otherwise used for commercial purposes  All illustrations and images included in CareNotes® are the copyrighted property of A [employer] M [employer] Inc  or [employer]  The above information is an  only   It is not intended as medical advice for individual conditions or treatments  Talk to your doctor, nurse or pharmacist before following any medical regimen to see if it is safe and effective for you

## 2019-08-26 NOTE — LETTER
August 26, 2019     Zeus Dougherty, [id number] [facility] [address] [address]    Patient: Anna Burnham   YOB: 1945   Date of Visit: 8/26/2019       Dear Dr Nayeli Donahue: Thank you for referring Jordon Citizen to me for evaluation  Below are my notes for this consultation  If you have questions, please do not hesitate to call me  I look forward to following your patient along with you  Sincerely,        Dustin Wyatt MD        CC: No Recipients  Dustin Wyatt MD  8/26/2019  1:53 PM  Sign at close encounter               Surgical Oncology Follow Up       [address] [address]  [address] [place] [address] Ave  1945  [id number]  [address] [place] [address] [facility] [place] [address]  [phone]    Chief Complaint   Patient presents with    Consult     Hyperparathyroidism  Assessment/Plan:    No problem-specific Assessment & Plan notes found for this encounter  Diagnoses and all orders for this visit:    Hyperparathyroidism ([address] )  -     Case request operating room: PARATHYROIDECTOMY, MINIMALLY INVASIVE, right, MONITORING INTRAOPERATIVE PTH (PARATHYROID HORMONE); Standing  -     Comprehensive metabolic panel; Future  -     CBC and differential; Future  -     EKG 12 lead; Future  -     XR chest pa & lateral; Future  -     Case request operating room: PARATHYROIDECTOMY, MINIMALLY INVASIVE, right, MONITORING INTRAOPERATIVE PTH (PARATHYROID HORMONE)    Hypercalcemia  -     Case request operating room: PARATHYROIDECTOMY, MINIMALLY INVASIVE, right, MONITORING INTRAOPERATIVE PTH (PARATHYROID HORMONE); Standing  -     Comprehensive metabolic panel; Future  -     CBC and differential; Future  -     EKG 12 lead;  Future  -     XR chest pa & lateral; Future  -     Case request operating room: PARATHYROIDECTOMY, MINIMALLY INVASIVE, right, MONITORING INTRAOPERATIVE PTH (PARATHYROID HORMONE)    High serum parathyroid hormone (PTH)  -     Case request operating room: PARATHYROIDECTOMY, MINIMALLY INVASIVE, right, MONITORING INTRAOPERATIVE PTH (PARATHYROID HORMONE); Standing  -     Comprehensive metabolic panel; Future  -     CBC and differential; Future  -     EKG 12 lead; Future  -     XR chest pa & lateral; Future  -     Case request operating room: PARATHYROIDECTOMY, MINIMALLY INVASIVE, right, MONITORING INTRAOPERATIVE PTH (PARATHYROID HORMONE)    Other orders  -     Incentive spirometry; Standing  -     Insert and maintain IV line; Standing  -     Void On-Call to O R ; Standing  -     Place sequential compression device; Standing        Advance Care Planning/Advance Directives:  Discussed disease status, cancer treatment plans and/or cancer treatment goals with the patient  No history exists  History of Present Illness:  71-year-old woman with what appears to be primary hyperparathyroidism  She has had several decades of kidney stones, with most recent attacks being earlier this spring  She underwent workup including calcium, PTH, and ultimately sestamibi check which revealed what appears to be an area of uptake in the right neck  She has been referred for evaluation and treatment  She complains of fatigue, depressed mood, reflux, abdominal pain/constipation, muscle aches and pains  Review of Systems   Constitutional: Positive for fatigue  HENT: Negative  Eyes: Negative  Respiratory: Negative  Cardiovascular: Negative  Gastrointestinal: Positive for abdominal pain  Endocrine: Negative  Genitourinary: Negative  Musculoskeletal: Positive for arthralgias, back pain, gait problem and myalgias  Skin: Negative  Allergic/Immunologic: Negative  Hematological: Negative  Psychiatric/Behavioral: Positive for decreased concentration          Issues with memory and mood         Patient Active Problem List   Diagnosis    UTI (urinary tract infection)    Hydronephrosis    Accelerated hypertension    Calculus of ureter    Hypercalcemia    Hyperparathyroidism (HCC)    High serum parathyroid hormone (PTH)     Past Medical History:   Diagnosis Date    Kidney stones      Past Surgical History:   Procedure Laterality Date    BACK SURGERY      FOOT SURGERY       No family history on file    Social History     Socioeconomic History    Marital status: /Civil Union     Spouse name: Not on file    Number of children: Not on file    Years of education: Not on file    Highest education level: Not on file   Occupational History    Occupation: advisor   part time  [address] resource strain: Not on file    Food insecurity:     Worry: Not on file     Inability: Not on file    Transportation needs:     Medical: Not on file     Non-medical: Not on file   Tobacco Use    Smoking status: Never Smoker    Smokeless tobacco: Never Used   Substance and Sexual Activity    Alcohol use: Not Currently    Drug use: No    Sexual activity: Not on file   Lifestyle    Physical activity:     Days per week: Not on file     Minutes per session: Not on file    Stress: Not on file   Relationships    Social connections:     Talks on phone: Not on file     Gets together: Not on file     Attends [employer] service: Not on file     Active member of club or organization: Not on file     Attends meetings of clubs or organizations: Not on file     Relationship status: Not on file    Intimate partner violence:     Fear of current or ex partner: Not on file     Emotionally abused: Not on file     Physically abused: Not on file     Forced sexual activity: Not on file   Other Topics Concern    Not on file   Social History Narrative    Not on file       Current Outpatient Medications:     cholecalciferol (VITAMIN D3) 1,000 units tablet, Take 1,000 Units by mouth daily, Disp: , Rfl:     meloxicam (MOBIC) 7 5 mg tablet, , Disp: , Rfl:     Misc Natural Products (TURMERIC CURCUMIN) CAPS, Take by mouth, Disp: , Rfl:   Allergies   Allergen Reactions    Sulfa Antibiotics      Vitals:    08/26/19 1305   BP: 132/82   Pulse: 71   Resp: 16   Temp: 98 2 °F (36 8 °C)       Physical Exam   Constitutional: She is oriented to person, place, and time  She appears well-developed and well-nourished  HENT:   Head: Normocephalic and atraumatic  Right Ear: External ear normal    Left Ear: External ear normal    Eyes: Pupils are equal, round, and reactive to light  EOM are normal    Neck: Normal range of motion  Neck supple  No JVD present  No tracheal deviation present  No thyromegaly present  Cardiovascular: Normal rate, regular rhythm and normal heart sounds  Pulmonary/Chest: Effort normal and breath sounds normal    Abdominal: Soft  Bowel sounds are normal    Musculoskeletal: Normal range of motion  Lymphadenopathy:     She has no cervical adenopathy  Neurological: She is alert and oriented to person, place, and time  Skin: Skin is warm and dry  Psychiatric: She has a normal mood and affect   Her behavior is normal  Judgment and thought content normal        Pathology:  none    Labs:  CBC, Coags, BMP, Mg, Phos   Lab Results   Component Value Date     7 (H) 07/17/2019    CALCIUM 9 7 07/17/2019    PHOS 3 1 07/17/2019       Imaging  PARATHYROID SCAN     INDICATION:  E21 3: Hyperparathyroidism, unspecified     COMPARISON:  None      TECHNIQUE:   Following the intravenous administration of 27 1 mCi Tc-99m Cardiolite, anterior and bilateral anterior oblique projection images of the neck and mediastinum were obtained at approximately 10 minutes post injection followed at 2 hours post   injection by static anterior and bilateral oblique projections as well as SPECT images in coronal, sagittal and axial projections       FINDINGS:     Early images demonstrate radiotracer uptake in the thyroid gland bilaterally  This is more focal in the right lower pole region      Delayed images demonstrate washout of thyroid gland activity  Faint focal radiotracer uptake noted in the right lower pole region      SPECT images demonstrate slight focal radiotracer uptake in the right lower pole region  This may be in the thyroid gland  No additional suspicious foci of radiotracer uptake      IMPRESSION:     1  Mild focal radiotracer uptake in the right lower pole region  This could be related to a thyroid nodule rather than a parathyroid adenoma  This could be further evaluated beginning with thyroid ultrasound      Workstation performed: [id number]     I reviewed the above laboratory and imaging data  Discussion/Summary:  Hyperparathyroidism  Suspected right-sided adenoma  Patient is candidate for minimally invasive right parathyroidectomy, possible 4 gland exploration with intraoperative PTH monitoring  Rationale for this along with risks and benefits of surgery including infection, bleeding, need for possible additional surgery, discussed with patient  She understands the plan and wishes to proceed as outlined

## 2019-09-12 ENCOUNTER — APPOINTMENT (OUTPATIENT)
Dept: RADIOLOGY | Facility: MEDICAL CENTER | Age: 74
End: 2019-09-12
Payer: MEDICARE

## 2019-09-12 ENCOUNTER — CLINICAL SUPPORT (OUTPATIENT)
Dept: URGENT CARE | Facility: MEDICAL CENTER | Age: 74
End: 2019-09-12
Payer: MEDICARE

## 2019-09-12 ENCOUNTER — APPOINTMENT (OUTPATIENT)
Dept: LAB | Facility: MEDICAL CENTER | Age: 74
End: 2019-09-12
Payer: MEDICARE

## 2019-09-12 DIAGNOSIS — E83.52 HYPERCALCEMIA: ICD-10-CM

## 2019-09-12 DIAGNOSIS — R79.89 HIGH SERUM PARATHYROID HORMONE (PTH): ICD-10-CM

## 2019-09-12 DIAGNOSIS — E21.3 HYPERPARATHYROIDISM (HCC): ICD-10-CM

## 2019-09-12 LAB
ALBUMIN SERPL BCP-MCNC: 3.4 G/DL (ref 3.5–5)
ALP SERPL-CCNC: 127 U/L (ref 46–116)
ALT SERPL W P-5'-P-CCNC: 33 U/L (ref 12–78)
ANION GAP SERPL CALCULATED.3IONS-SCNC: 7 MMOL/L (ref 4–13)
AST SERPL W P-5'-P-CCNC: 29 U/L (ref 5–45)
ATRIAL RATE: 64 BPM
BASOPHILS # BLD AUTO: 0.03 THOUSANDS/ΜL (ref 0–0.1)
BASOPHILS NFR BLD AUTO: 1 % (ref 0–1)
BILIRUB SERPL-MCNC: 0.75 MG/DL (ref 0.2–1)
BUN SERPL-MCNC: 18 MG/DL (ref 5–25)
CALCIUM SERPL-MCNC: 9.8 MG/DL (ref 8.3–10.1)
CHLORIDE SERPL-SCNC: 113 MMOL/L (ref 100–108)
CO2 SERPL-SCNC: 23 MMOL/L (ref 21–32)
CREAT SERPL-MCNC: 0.51 MG/DL (ref 0.6–1.3)
EOSINOPHIL # BLD AUTO: 0.14 THOUSAND/ΜL (ref 0–0.61)
EOSINOPHIL NFR BLD AUTO: 4 % (ref 0–6)
ERYTHROCYTE [DISTWIDTH] IN BLOOD BY AUTOMATED COUNT: 12.3 % (ref 11.6–15.1)
GFR SERPL CREATININE-BSD FRML MDRD: 96 ML/MIN/1.73SQ M
GLUCOSE P FAST SERPL-MCNC: 103 MG/DL (ref 65–99)
HCT VFR BLD AUTO: 39.6 % (ref 34.8–46.1)
HGB BLD-MCNC: 12.8 G/DL (ref 11.5–15.4)
IMM GRANULOCYTES # BLD AUTO: 0.01 THOUSAND/UL (ref 0–0.2)
IMM GRANULOCYTES NFR BLD AUTO: 0 % (ref 0–2)
LYMPHOCYTES # BLD AUTO: 0.9 THOUSANDS/ΜL (ref 0.6–4.47)
LYMPHOCYTES NFR BLD AUTO: 29 % (ref 14–44)
MCH RBC QN AUTO: 28.6 PG (ref 26.8–34.3)
MCHC RBC AUTO-ENTMCNC: 32.3 G/DL (ref 31.4–37.4)
MCV RBC AUTO: 89 FL (ref 82–98)
MONOCYTES # BLD AUTO: 0.3 THOUSAND/ΜL (ref 0.17–1.22)
MONOCYTES NFR BLD AUTO: 10 % (ref 4–12)
NEUTROPHILS # BLD AUTO: 1.77 THOUSANDS/ΜL (ref 1.85–7.62)
NEUTS SEG NFR BLD AUTO: 56 % (ref 43–75)
NRBC BLD AUTO-RTO: 0 /100 WBCS
P AXIS: 39 DEGREES
PLATELET # BLD AUTO: 227 THOUSANDS/UL (ref 149–390)
PMV BLD AUTO: 10.3 FL (ref 8.9–12.7)
POTASSIUM SERPL-SCNC: 4 MMOL/L (ref 3.5–5.3)
PR INTERVAL: 176 MS
PROT SERPL-MCNC: 6.6 G/DL (ref 6.4–8.2)
QRS AXIS: -1 DEGREES
QRSD INTERVAL: 110 MS
QT INTERVAL: 404 MS
QTC INTERVAL: 416 MS
RBC # BLD AUTO: 4.47 MILLION/UL (ref 3.81–5.12)
SODIUM SERPL-SCNC: 143 MMOL/L (ref 136–145)
T WAVE AXIS: 45 DEGREES
VENTRICULAR RATE: 64 BPM
WBC # BLD AUTO: 3.15 THOUSAND/UL (ref 4.31–10.16)

## 2019-09-12 PROCEDURE — 85025 COMPLETE CBC W/AUTO DIFF WBC: CPT

## 2019-09-12 PROCEDURE — 80053 COMPREHEN METABOLIC PANEL: CPT

## 2019-09-12 PROCEDURE — 71046 X-RAY EXAM CHEST 2 VIEWS: CPT

## 2019-09-12 PROCEDURE — 36415 COLL VENOUS BLD VENIPUNCTURE: CPT

## 2019-09-12 PROCEDURE — 93010 ELECTROCARDIOGRAM REPORT: CPT | Performed by: INTERNAL MEDICINE

## 2019-09-12 PROCEDURE — 93005 ELECTROCARDIOGRAM TRACING: CPT

## 2019-09-20 DIAGNOSIS — R79.89 HIGH SERUM PARATHYROID HORMONE (PTH): Primary | ICD-10-CM

## 2019-09-20 RX ORDER — TRAMADOL HYDROCHLORIDE 50 MG/1
50 TABLET ORAL EVERY 6 HOURS PRN
Qty: 10 TABLET | Refills: 0 | Status: SHIPPED | OUTPATIENT
Start: 2019-09-20 | End: 2020-05-29 | Stop reason: HOSPADM

## 2019-10-01 NOTE — PRE-PROCEDURE INSTRUCTIONS
Pre-Surgery Instructions:   Medication Instructions    cholecalciferol (VITAMIN D3) 1,000 units tablet Instructed patient per Anesthesia Guidelines   traMADol (ULTRAM) 50 mg tablet Instructed patient per Anesthesia Guidelines

## 2019-10-02 ENCOUNTER — ANESTHESIA EVENT (OUTPATIENT)
Dept: PERIOP | Facility: HOSPITAL | Age: 74
End: 2019-10-02
Payer: MEDICARE

## 2019-10-03 ENCOUNTER — ANESTHESIA (OUTPATIENT)
Dept: PERIOP | Facility: HOSPITAL | Age: 74
End: 2019-10-03
Payer: MEDICARE

## 2019-10-03 ENCOUNTER — HOSPITAL ENCOUNTER (OUTPATIENT)
Facility: HOSPITAL | Age: 74
Setting detail: OUTPATIENT SURGERY
Discharge: HOME | End: 2019-10-03
Attending: SURGERY | Admitting: SURGERY
Payer: MEDICARE

## 2019-10-03 VITALS
HEART RATE: 86 BPM | HEIGHT: 64 IN | TEMPERATURE: 98.7 F | WEIGHT: 189 LBS | DIASTOLIC BLOOD PRESSURE: 75 MMHG | OXYGEN SATURATION: 94 % | BODY MASS INDEX: 32.27 KG/M2 | SYSTOLIC BLOOD PRESSURE: 146 MMHG | RESPIRATION RATE: 22 BRPM

## 2019-10-03 DIAGNOSIS — E21.3 HYPERPARATHYROIDISM (HCC): ICD-10-CM

## 2019-10-03 DIAGNOSIS — E83.52 HYPERCALCEMIA: ICD-10-CM

## 2019-10-03 DIAGNOSIS — R79.89 HIGH SERUM PARATHYROID HORMONE (PTH): ICD-10-CM

## 2019-10-03 LAB
CALCIUM SERPL-MCNC: 9.4 MG/DL (ref 8.3–10.1)
CALCIUM SERPL-MCNC: 9.6 MG/DL (ref 8.3–10.1)
PTH-INTACT SERPL-MCNC: 22.3 PG/ML (ref 18.4–80.1)
PTH-INTACT SERPL-MCNC: 28.4 PG/ML (ref 18.4–80.1)
PTH-INTACT SERPL-MCNC: 88.8 PG/ML (ref 18.4–80.1)
PTH-INTACT SERPL-MCNC: 94.5 PG/ML (ref 18.4–80.1)

## 2019-10-03 PROCEDURE — 60500 EXPLORE PARATHYROID GLANDS: CPT | Performed by: SURGERY

## 2019-10-03 PROCEDURE — 88331 PATH CONSLTJ SURG 1 BLK 1SPC: CPT | Performed by: PATHOLOGY

## 2019-10-03 PROCEDURE — 88331 PATH CONSLTJ SURG 1 BLK 1SPC: CPT | Performed by: SURGERY

## 2019-10-03 PROCEDURE — 88305 TISSUE EXAM BY PATHOLOGIST: CPT | Performed by: PATHOLOGY

## 2019-10-03 PROCEDURE — 82310 ASSAY OF CALCIUM: CPT | Performed by: SURGERY

## 2019-10-03 PROCEDURE — 83970 ASSAY OF PARATHORMONE: CPT | Performed by: SURGERY

## 2019-10-03 RX ORDER — GLYCOPYRROLATE 0.2 MG/ML
INJECTION INTRAMUSCULAR; INTRAVENOUS AS NEEDED
Status: DISCONTINUED | OUTPATIENT
Start: 2019-10-03 | End: 2019-10-03 | Stop reason: SURG

## 2019-10-03 RX ORDER — SODIUM CHLORIDE, SODIUM LACTATE, POTASSIUM CHLORIDE, CALCIUM CHLORIDE 600; 310; 30; 20 MG/100ML; MG/100ML; MG/100ML; MG/100ML
125 INJECTION, SOLUTION INTRAVENOUS CONTINUOUS
Status: DISCONTINUED | OUTPATIENT
Start: 2019-10-03 | End: 2019-10-03 | Stop reason: HOSPADM

## 2019-10-03 RX ORDER — ONDANSETRON 2 MG/ML
INJECTION INTRAMUSCULAR; INTRAVENOUS AS NEEDED
Status: DISCONTINUED | OUTPATIENT
Start: 2019-10-03 | End: 2019-10-03 | Stop reason: SURG

## 2019-10-03 RX ORDER — DEXAMETHASONE SODIUM PHOSPHATE 10 MG/ML
INJECTION, SOLUTION INTRAMUSCULAR; INTRAVENOUS AS NEEDED
Status: DISCONTINUED | OUTPATIENT
Start: 2019-10-03 | End: 2019-10-03 | Stop reason: SURG

## 2019-10-03 RX ORDER — LIDOCAINE HYDROCHLORIDE 10 MG/ML
0.5 INJECTION, SOLUTION EPIDURAL; INFILTRATION; INTRACAUDAL; PERINEURAL ONCE AS NEEDED
Status: DISCONTINUED | OUTPATIENT
Start: 2019-10-03 | End: 2019-10-03 | Stop reason: HOSPADM

## 2019-10-03 RX ORDER — FENTANYL CITRATE/PF 50 MCG/ML
25 SYRINGE (ML) INJECTION
Status: DISCONTINUED | OUTPATIENT
Start: 2019-10-03 | End: 2019-10-03 | Stop reason: HOSPADM

## 2019-10-03 RX ORDER — ONDANSETRON 2 MG/ML
4 INJECTION INTRAMUSCULAR; INTRAVENOUS ONCE AS NEEDED
Status: DISCONTINUED | OUTPATIENT
Start: 2019-10-03 | End: 2019-10-03 | Stop reason: HOSPADM

## 2019-10-03 RX ORDER — FENTANYL CITRATE 50 UG/ML
INJECTION, SOLUTION INTRAMUSCULAR; INTRAVENOUS AS NEEDED
Status: DISCONTINUED | OUTPATIENT
Start: 2019-10-03 | End: 2019-10-03 | Stop reason: SURG

## 2019-10-03 RX ORDER — PROMETHAZINE HYDROCHLORIDE 25 MG/ML
12.5 INJECTION, SOLUTION INTRAMUSCULAR; INTRAVENOUS ONCE AS NEEDED
Status: DISCONTINUED | OUTPATIENT
Start: 2019-10-03 | End: 2019-10-03 | Stop reason: HOSPADM

## 2019-10-03 RX ORDER — SODIUM CHLORIDE, SODIUM LACTATE, POTASSIUM CHLORIDE, CALCIUM CHLORIDE 600; 310; 30; 20 MG/100ML; MG/100ML; MG/100ML; MG/100ML
INJECTION, SOLUTION INTRAVENOUS CONTINUOUS PRN
Status: DISCONTINUED | OUTPATIENT
Start: 2019-10-03 | End: 2019-10-03

## 2019-10-03 RX ORDER — NEOSTIGMINE METHYLSULFATE 1 MG/ML
INJECTION INTRAVENOUS AS NEEDED
Status: DISCONTINUED | OUTPATIENT
Start: 2019-10-03 | End: 2019-10-03 | Stop reason: SURG

## 2019-10-03 RX ORDER — ROCURONIUM BROMIDE 10 MG/ML
INJECTION, SOLUTION INTRAVENOUS AS NEEDED
Status: DISCONTINUED | OUTPATIENT
Start: 2019-10-03 | End: 2019-10-03 | Stop reason: SURG

## 2019-10-03 RX ORDER — METOCLOPRAMIDE HYDROCHLORIDE 5 MG/ML
10 INJECTION INTRAMUSCULAR; INTRAVENOUS ONCE AS NEEDED
Status: DISCONTINUED | OUTPATIENT
Start: 2019-10-03 | End: 2019-10-03 | Stop reason: HOSPADM

## 2019-10-03 RX ORDER — OXYCODONE HYDROCHLORIDE 5 MG/1
5 TABLET ORAL EVERY 4 HOURS PRN
Status: DISCONTINUED | OUTPATIENT
Start: 2019-10-03 | End: 2019-10-03 | Stop reason: HOSPADM

## 2019-10-03 RX ORDER — LABETALOL 20 MG/4 ML (5 MG/ML) INTRAVENOUS SYRINGE
10
Status: DISCONTINUED | OUTPATIENT
Start: 2019-10-03 | End: 2019-10-03 | Stop reason: HOSPADM

## 2019-10-03 RX ORDER — ACETAMINOPHEN 325 MG/1
650 TABLET ORAL EVERY 6 HOURS SCHEDULED
Status: DISCONTINUED | OUTPATIENT
Start: 2019-10-03 | End: 2019-10-03 | Stop reason: HOSPADM

## 2019-10-03 RX ORDER — PROPOFOL 10 MG/ML
INJECTION, EMULSION INTRAVENOUS AS NEEDED
Status: DISCONTINUED | OUTPATIENT
Start: 2019-10-03 | End: 2019-10-03 | Stop reason: SURG

## 2019-10-03 RX ORDER — HYDROMORPHONE HCL/PF 1 MG/ML
0.5 SYRINGE (ML) INJECTION
Status: DISCONTINUED | OUTPATIENT
Start: 2019-10-03 | End: 2019-10-03 | Stop reason: HOSPADM

## 2019-10-03 RX ORDER — BUPIVACAINE HYDROCHLORIDE 2.5 MG/ML
INJECTION, SOLUTION EPIDURAL; INFILTRATION; INTRACAUDAL AS NEEDED
Status: DISCONTINUED | OUTPATIENT
Start: 2019-10-03 | End: 2019-10-03 | Stop reason: HOSPADM

## 2019-10-03 RX ORDER — ALBUTEROL SULFATE 2.5 MG/3ML
2.5 SOLUTION RESPIRATORY (INHALATION) ONCE AS NEEDED
Status: DISCONTINUED | OUTPATIENT
Start: 2019-10-03 | End: 2019-10-03 | Stop reason: HOSPADM

## 2019-10-03 RX ORDER — HYDRALAZINE HYDROCHLORIDE 20 MG/ML
5 INJECTION INTRAMUSCULAR; INTRAVENOUS
Status: DISCONTINUED | OUTPATIENT
Start: 2019-10-03 | End: 2019-10-03 | Stop reason: HOSPADM

## 2019-10-03 RX ORDER — HYDROMORPHONE HCL/PF 1 MG/ML
0.2 SYRINGE (ML) INJECTION
Status: DISCONTINUED | OUTPATIENT
Start: 2019-10-03 | End: 2019-10-03 | Stop reason: HOSPADM

## 2019-10-03 RX ORDER — EPHEDRINE SULFATE 50 MG/ML
INJECTION INTRAVENOUS AS NEEDED
Status: DISCONTINUED | OUTPATIENT
Start: 2019-10-03 | End: 2019-10-03 | Stop reason: SURG

## 2019-10-03 RX ADMIN — EPHEDRINE SULFATE 5 MG: 50 INJECTION, SOLUTION INTRAVENOUS at 08:37

## 2019-10-03 RX ADMIN — EPHEDRINE SULFATE 10 MG: 50 INJECTION, SOLUTION INTRAVENOUS at 08:26

## 2019-10-03 RX ADMIN — DEXAMETHASONE SODIUM PHOSPHATE 10 MG: 10 INJECTION, SOLUTION INTRAMUSCULAR; INTRAVENOUS at 08:25

## 2019-10-03 RX ADMIN — PHENYLEPHRINE HYDROCHLORIDE 100 MCG: 10 INJECTION INTRAVENOUS at 08:11

## 2019-10-03 RX ADMIN — PHENYLEPHRINE HYDROCHLORIDE 50 MCG: 10 INJECTION INTRAVENOUS at 09:15

## 2019-10-03 RX ADMIN — EPHEDRINE SULFATE 5 MG: 50 INJECTION, SOLUTION INTRAVENOUS at 08:32

## 2019-10-03 RX ADMIN — OXYCODONE HYDROCHLORIDE 5 MG: 5 TABLET ORAL at 15:32

## 2019-10-03 RX ADMIN — GLYCOPYRROLATE 0.4 MG: 0.2 INJECTION, SOLUTION INTRAMUSCULAR; INTRAVENOUS at 09:38

## 2019-10-03 RX ADMIN — SODIUM CHLORIDE, SODIUM LACTATE, POTASSIUM CHLORIDE, AND CALCIUM CHLORIDE: .6; .31; .03; .02 INJECTION, SOLUTION INTRAVENOUS at 07:30

## 2019-10-03 RX ADMIN — EPHEDRINE SULFATE 10 MG: 50 INJECTION, SOLUTION INTRAVENOUS at 08:09

## 2019-10-03 RX ADMIN — LIDOCAINE HYDROCHLORIDE 100 MG: 20 INJECTION, SOLUTION INTRAVENOUS at 08:05

## 2019-10-03 RX ADMIN — PROPOFOL 200 MG: 10 INJECTION, EMULSION INTRAVENOUS at 08:05

## 2019-10-03 RX ADMIN — SODIUM CHLORIDE, SODIUM LACTATE, POTASSIUM CHLORIDE, AND CALCIUM CHLORIDE: .6; .31; .03; .02 INJECTION, SOLUTION INTRAVENOUS at 09:32

## 2019-10-03 RX ADMIN — EPHEDRINE SULFATE 10 MG: 50 INJECTION, SOLUTION INTRAVENOUS at 08:58

## 2019-10-03 RX ADMIN — FENTANYL CITRATE 50 MCG: 50 INJECTION, SOLUTION INTRAMUSCULAR; INTRAVENOUS at 08:52

## 2019-10-03 RX ADMIN — FENTANYL CITRATE 50 MCG: 50 INJECTION, SOLUTION INTRAMUSCULAR; INTRAVENOUS at 08:22

## 2019-10-03 RX ADMIN — ONDANSETRON 4 MG: 2 INJECTION INTRAMUSCULAR; INTRAVENOUS at 09:32

## 2019-10-03 RX ADMIN — NEOSTIGMINE METHYLSULFATE 3 MG: 1 INJECTION, SOLUTION INTRAVENOUS at 09:38

## 2019-10-03 RX ADMIN — FENTANYL CITRATE 50 MCG: 50 INJECTION, SOLUTION INTRAMUSCULAR; INTRAVENOUS at 08:05

## 2019-10-03 RX ADMIN — ROCURONIUM BROMIDE 50 MG: 50 INJECTION, SOLUTION INTRAVENOUS at 08:05

## 2019-10-03 RX ADMIN — PHENYLEPHRINE HYDROCHLORIDE 50 MCG: 10 INJECTION INTRAVENOUS at 08:45

## 2019-10-03 NOTE — H&P
[address] [facility] [place] Du[place] [address] [place] [address]  [phone]     Majorie Cheadle  1945  [id number]  [address] [facility] Andrea Hager  [address] [address] [address] [phone]          Chief Complaint   Patient presents with    Consult       Hyperparathyroidism          Assessment/Plan:     No problem-specific Assessment & Plan notes found for this encounter          Diagnoses and all orders for this visit:     Hyperparathyroidism ([address] )  -     Case request operating room: PARATHYROIDECTOMY, MINIMALLY INVASIVE, right, MONITORING INTRAOPERATIVE PTH (PARATHYROID HORMONE); Standing  -     Comprehensive metabolic panel; Future  -     CBC and differential; Future  -     EKG 12 lead; Future  -     XR chest pa & lateral; Future  -     Case request operating room: PARATHYROIDECTOMY, MINIMALLY INVASIVE, right, MONITORING INTRAOPERATIVE PTH (PARATHYROID HORMONE)     Hypercalcemia  -     Case request operating room: PARATHYROIDECTOMY, MINIMALLY INVASIVE, right, MONITORING INTRAOPERATIVE PTH (PARATHYROID HORMONE); Standing  -     Comprehensive metabolic panel; Future  -     CBC and differential; Future  -     EKG 12 lead; Future  -     XR chest pa & lateral; Future  -     Case request operating room: PARATHYROIDECTOMY, MINIMALLY INVASIVE, right, MONITORING INTRAOPERATIVE PTH (PARATHYROID HORMONE)     High serum parathyroid hormone (PTH)  -     Case request operating room: PARATHYROIDECTOMY, MINIMALLY INVASIVE, right, MONITORING INTRAOPERATIVE PTH (PARATHYROID HORMONE); Standing  -     Comprehensive metabolic panel; Future  -     CBC and differential; Future  -     EKG 12 lead;  Future  -     XR chest pa & lateral; Future  -     Case request operating room: PARATHYROIDECTOMY, MINIMALLY INVASIVE, right, MONITORING INTRAOPERATIVE PTH (PARATHYROID HORMONE)     Other orders  -     Incentive spirometry; Standing  -     Insert and maintain IV line; Standing  -     Void On-Call to O R ; Standing  -     Place sequential compression device; Standing         Advance Care Planning/Advance Directives:  Discussed disease status, cancer treatment plans and/or cancer treatment goals with the patient         No history exists          History of Present Illness:  78-year-old woman with what appears to be primary hyperparathyroidism  She has had several decades of kidney stones, with most recent attacks being earlier this spring  She underwent workup including calcium, PTH, and ultimately sestamibi check which revealed what appears to be an area of uptake in the right neck  She has been referred for evaluation and treatment  She complains of fatigue, depressed mood, reflux, abdominal pain/constipation, muscle aches and pains      Review of Systems   Constitutional: Positive for fatigue  HENT: Negative  Eyes: Negative  Respiratory: Negative  Cardiovascular: Negative  Gastrointestinal: Positive for abdominal pain  Endocrine: Negative  Genitourinary: Negative  Musculoskeletal: Positive for arthralgias, back pain, gait problem and myalgias  Skin: Negative  Allergic/Immunologic: Negative  Hematological: Negative  Psychiatric/Behavioral: Positive for decreased concentration  Issues with memory and mood                Patient Active Problem List   Diagnosis    UTI (urinary tract infection)    Hydronephrosis    Accelerated hypertension    Calculus of ureter    Hypercalcemia    Hyperparathyroidism (HCC)    High serum parathyroid hormone (PTH)      Medical History        Past Medical History:   Diagnosis Date    Kidney stones           Surgical History         Past Surgical History:   Procedure Laterality Date    BACK SURGERY        FOOT SURGERY             No family history on file    Social History               Socioeconomic History    Marital status: /Civil Union       Spouse name: Not on file    Number of children: Not on file    Years of education: Not on file    Highest education level: Not on file   Occupational History    Occupation: advisor   part time  [address] resource strain: Not on file    Food insecurity:       Worry: Not on file       Inability: Not on file    Transportation needs:       Medical: Not on file       Non-medical: Not on file   Tobacco Use    Smoking status: Never Smoker    Smokeless tobacco: Never Used   Substance and Sexual Activity    Alcohol use: Not Currently    Drug use: No    Sexual activity: Not on file   Lifestyle    Physical activity:       Days per week: Not on file       Minutes per session: Not on file    Stress: Not on file   Relationships    Social connections:       Talks on phone: Not on file       Gets together: Not on file       Attends Rastafari service: Not on file       Active member of club or organization: Not on file       Attends meetings of clubs or organizations: Not on file       Relationship status: Not on file    Intimate partner violence:       Fear of current or ex partner: Not on file       Emotionally abused: Not on file       Physically abused: Not on file       Forced sexual activity: Not on file   Other Topics Concern    Not on file   Social History Narrative    Not on file            Current Outpatient Medications:     cholecalciferol (VITAMIN D3) 1,000 units tablet, Take 1,000 Units by mouth daily, Disp: , Rfl:     meloxicam (MOBIC) 7 5 mg tablet, , Disp: , Rfl:     Misc Natural Products (TURMERIC CURCUMIN) CAPS, Take by mouth, Disp: , Rfl:        Allergies   Allergen Reactions    Sulfa Antibiotics            Vitals:     08/26/19 1305   BP: 132/82   Pulse: 71   Resp: 16   Temp: 98 2 °F (36 8 °C)         Physical Exam   Constitutional: She is oriented to person, place, and time  She appears well-developed and well-nourished     HENT:   Head: Normocephalic and atraumatic  Right Ear: External ear normal    Left Ear: External ear normal    Eyes: Pupils are equal, round, and reactive to light  EOM are normal    Neck: Normal range of motion  Neck supple  No JVD present  No tracheal deviation present  No thyromegaly present  Cardiovascular: Normal rate, regular rhythm and normal heart sounds  Pulmonary/Chest: Effort normal and breath sounds normal    Abdominal: Soft  Bowel sounds are normal    Musculoskeletal: Normal range of motion  Lymphadenopathy:     She has no cervical adenopathy  Neurological: She is alert and oriented to person, place, and time  Skin: Skin is warm and dry  Psychiatric: She has a normal mood and affect  Her behavior is normal  Judgment and thought content normal          Pathology:  none     Labs:  CBC, Coags, BMP, Mg, Phos   Lab Results   Component Value Date      7 (H) 07/17/2019     CALCIUM 9 7 07/17/2019     PHOS 3 1 07/17/2019         Imaging  PARATHYROID SCAN     INDICATION:  E21 3: Hyperparathyroidism, unspecified     COMPARISON:  None      TECHNIQUE:   Following the intravenous administration of 27 1 mCi Tc-99m Cardiolite, anterior and bilateral anterior oblique projection images of the neck and mediastinum were obtained at approximately 10 minutes post injection followed at 2 hours post   injection by static anterior and bilateral oblique projections as well as SPECT images in coronal, sagittal and axial projections       FINDINGS:     Early images demonstrate radiotracer uptake in the thyroid gland bilaterally   This is more focal in the right lower pole region      Delayed images demonstrate washout of thyroid gland activity   Faint focal radiotracer uptake noted in the right lower pole region      SPECT images demonstrate slight focal radiotracer uptake in the right lower pole region   This may be in the thyroid gland   No additional suspicious foci of radiotracer uptake      IMPRESSION:     1   Mild focal radiotracer uptake in the right lower pole region   This could be related to a thyroid nodule rather than a parathyroid adenoma   This could be further evaluated beginning with thyroid ultrasound      Workstation performed: [id number]     I reviewed the above laboratory and imaging data      Discussion/Summary:  Hyperparathyroidism  Suspected right-sided adenoma  Patient is candidate for minimally invasive right parathyroidectomy, possible 4 gland exploration with intraoperative PTH monitoring  Rationale for this along with risks and benefits of surgery including infection, bleeding, need for possible additional surgery, discussed with patient  She understands the plan and wishes to proceed as outlined      /81   Pulse 65   Temp 97 9 °F (36 6 °C) (Tympanic)   Resp 16   Ht 5' 3 5" (1 613 m)   Wt 87 1 kg (192 lb)   SpO2 95%   BMI 33 48 kg/m²

## 2019-10-03 NOTE — DISCHARGE INSTRUCTIONS
Return to the hospital or call the clinic if you experience fever>101,  persistent nausea and vomiting, increasing pain or increased redness around your incision, or purulent drainage from your incision  The white strips over your incision will come off on their own  They are okay to get wet, just pat dry gently      Call the office or go to the ED for persistent or worsening numbness or tingling in your fingers/toes or around your mouth, this may be a symptom of low calcium

## 2019-10-03 NOTE — ANESTHESIA PROCEDURE NOTES
Arterial Line Insertion  Performed by: Bernardo Orona CRNA  Authorized by: Adrianna Giraldo MD   Preparation: Patient was prepped and draped in the usual sterile fashion    Indications: hemodynamic monitoring  Orientation:  Right  Location: radial artery  Procedure Details:  Needle gauge: 20  Number of attempts: 1    Post-procedure:  Post-procedure: dressing applied  Waveform: good waveform  Patient tolerance: Patient tolerated the procedure well with no immediate complications

## 2019-10-03 NOTE — ANESTHESIA POSTPROCEDURE EVALUATION
Post-Op Assessment Note    CV Status:  Stable  Pain Score: 0    Pain management: adequate     Mental Status:  Alert and awake   Hydration Status:  Euvolemic   PONV Controlled:  Controlled   Airway Patency:  Patent   Post Op Vitals Reviewed: Yes      Staff: CRNA, Anesthesiologist           /80 (10/03/19 0947)    Temp (!) 97 3 °F (36 3 °C) (10/03/19 0947)    Pulse 77 (10/03/19 0947)   Resp   20   SpO2 (P) 98 % (10/03/19 0947)

## 2019-10-03 NOTE — ANESTHESIA PREPROCEDURE EVALUATION
Review of Systems/Medical History  Patient summary reviewed  Chart reviewed  No history of anesthetic complications     Cardiovascular  EKG reviewed, Exercise tolerance (METS): <4, Exercise comment: Exercise limited by back pain Hypertension controlled, No orthopnea, No PND, No HUGGINS,    Pulmonary  Negative pulmonary ROS        GI/Hepatic    GERD well controlled,        Kidney stones,        Endo/Other  Parathyroid disease hyperparathyroidism,      GYN  Negative gynecology ROS          Hematology  Negative hematology ROS      Musculoskeletal  Back pain , lumbar pain,        Neurology  Negative neurology ROS      Psychology   Negative psychology ROS              Physical Exam    Airway    Mallampati score: III  TM Distance: >3 FB  Neck ROM: full     Dental       Cardiovascular  Rhythm: regular, Rate: normal, Cardiovascular exam normal    Pulmonary  Pulmonary exam normal Breath sounds clear to auscultation,     Other Findings        Anesthesia Plan  ASA Score- 2     Anesthesia Type- general with ASA Monitors  Additional Monitors: arterial line  Airway Plan: ETT  Plan Factors-    Induction- intravenous  Postoperative Plan-     Informed Consent- Anesthetic plan and risks discussed with patient  I personally reviewed this patient with the CRNA  Discussed and agreed on the Anesthesia Plan with the CRNA  Francisco Perez

## 2019-10-15 PROBLEM — Z98.890 STATUS POST PARATHYROIDECTOMY: Status: ACTIVE | Noted: 2019-10-15

## 2019-10-15 PROBLEM — E21.3 HYPERPARATHYROIDISM (HCC): Status: RESOLVED | Noted: 2019-05-13 | Resolved: 2019-10-15

## 2019-10-15 PROBLEM — Z90.89 STATUS POST PARATHYROIDECTOMY: Status: ACTIVE | Noted: 2019-10-15

## 2019-10-15 PROBLEM — R79.89 HIGH SERUM PARATHYROID HORMONE (PTH): Status: RESOLVED | Noted: 2019-08-22 | Resolved: 2019-10-15

## 2019-10-15 PROBLEM — E89.2 STATUS POST PARATHYROIDECTOMY (HCC): Status: ACTIVE | Noted: 2019-10-15

## 2019-10-17 PROBLEM — E21.0 PRIMARY HYPERPARATHYROIDISM (HCC): Status: ACTIVE | Noted: 2019-05-13

## 2019-10-18 ENCOUNTER — OFFICE VISIT (OUTPATIENT)
Dept: SURGICAL ONCOLOGY | Facility: CLINIC | Age: 74
End: 2019-10-18

## 2019-10-18 VITALS
HEIGHT: 64 IN | DIASTOLIC BLOOD PRESSURE: 80 MMHG | TEMPERATURE: 97.5 F | HEART RATE: 78 BPM | RESPIRATION RATE: 16 BRPM | BODY MASS INDEX: 32.44 KG/M2 | SYSTOLIC BLOOD PRESSURE: 140 MMHG

## 2019-10-18 DIAGNOSIS — E89.2 STATUS POST PARATHYROIDECTOMY (HCC): Primary | ICD-10-CM

## 2019-10-18 PROCEDURE — 99024 POSTOP FOLLOW-UP VISIT: CPT | Performed by: SURGERY

## 2019-10-18 RX ORDER — MELOXICAM 15 MG/1
15 TABLET ORAL DAILY
COMMUNITY
End: 2020-10-07

## 2019-10-18 NOTE — PROGRESS NOTES
Surgical Oncology Follow Up       [facility]  [facility] SURGICAL ONCOLOGY [facility] [address]    [facility]  1945  [id number]  [address]  [facility]ARE [facility]COL[facility]Y [facility]    Chief Complaint   Patient presents with    Follow-up       Assessment/Plan:    No problem-specific Assessment & Plan notes found for this encounter  Diagnoses and all orders for this visit:    Status post parathyroidectomy  -     Calcium; Future  -     PTH, intact; Future    Other orders  -     meloxicam (MOBIC) 15 mg tablet; Take 15 mg by mouth daily        Advance Care Planning/Advance Directives:  Discussed disease status, cancer treatment plans and/or cancer treatment goals with the patient  No history exists  History of Present Illness:  Patient is a 16-year-old woman here for postop check status post right MIP   -Interval History:  She has felt somewhat better since her procedure  Less fatigued noted  She still has achiness in her joints but has history of feet and back problems  Review of Systems:  Review of Systems   Constitutional: Negative  HENT: Negative  Eyes: Negative  Respiratory: Negative  Cardiovascular: Negative  Gastrointestinal: Negative  Endocrine: Negative  Genitourinary: Negative  Musculoskeletal: Positive for arthralgias and myalgias  Skin: Negative  Allergic/Immunologic: Negative  Neurological: Negative  Hematological: Negative  Psychiatric/Behavioral: Negative          Patient Active Problem List   Diagnosis    UTI (urinary tract infection)    Hydronephrosis    Accelerated hypertension    Calculus of ureter    Hypercalcemia    Primary hyperparathyroidism ([address] )    Status post parathyroidectomy     Past Medical History:   Diagnosis Date    Kidney stones      Past Surgical History:   Procedure Laterality Date    BACK SURGERY      FOOT SURGERY      [place] EXPLORE PARATHYROID GLANDS Right 10/3/2019    Procedure: MINIMALLY INVASIVE RIGHT PARATHYROIDECTOMY,  INRAOPERATIVE PTH MONITORING;  Surgeon: Wallace Antony MD;  [facility];  Service: Surgical Oncology     No family history on file    Social History     Socioeconomic History    Marital status: /Civil Union     Spouse name: Not on file    Number of children: Not on file    Years of education: Not on file    Highest education level: Not on file   Occupational History    Occupation: advisor   part time  [employer] [place] resource strain: Not on file    Food insecurity:     Worry: Not on file     Inability: Not on file    Transportation needs:     Medical: Not on file     Non-medical: Not on file   Tobacco Use    Smoking status: Never Smoker    Smokeless tobacco: Never Used   Substance and Sexual Activity    Alcohol use: Not Currently    Drug use: No    Sexual activity: Not on file   Lifestyle    Physical activity:     Days per week: Not on file     Minutes per session: Not on file    Stress: Not on file   Relationships    Social connections:     Talks on phone: Not on file     Gets together: Not on file     Attends Religion service: Not on file     Active member of club or organization: Not on file     Attends meetings of clubs or organizations: Not on file     Relationship status: Not on file    Intimate partner violence:     Fear of current or ex partner: Not on file     Emotionally abused: Not on file     Physically abused: Not on file     Forced sexual activity: Not on file   Other Topics Concern    Not on file   Social History Narrative    Not on file       Current Outpatient Medications:     cholecalciferol (VITAMIN D3) 1,000 units tablet, Take 1,000 Units by mouth daily, Disp: , Rfl:     meloxicam (MOBIC) 15 mg tablet, Take 15 mg by mouth daily, Disp: , Rfl:     traMADol (ULTRAM) 50 mg tablet, Take 1 tablet (50 mg total) by mouth every 6 (six) hours as needed for moderate pain (Patient not taking: Reported on 10/18/2019), Disp: 10 tablet, Rfl: 0  Allergies   Allergen Reactions    Sulfa Antibiotics Anaphylaxis     Vitals:    10/18/19 0826   BP: 140/80   Pulse: 78   Resp: 16   Temp: 97 5 °F (36 4 °C)       Physical Exam   Constitutional: She is oriented to person, place, and time  She appears well-developed and well-nourished  HENT:   Head: Normocephalic and atraumatic  Right Ear: External ear normal    Left Ear: External ear normal    Eyes: Pupils are equal, round, and reactive to light  EOM are normal    Neck: Normal range of motion  Neck supple  Cardiovascular: Normal rate and regular rhythm  Pulmonary/Chest: Effort normal and breath sounds normal    Abdominal: Soft  Bowel sounds are normal    Musculoskeletal: Normal range of motion  Neurological: She is alert and oriented to person, place, and time  Skin: Skin is warm and dry  Psychiatric: She has a normal mood and affect  Her behavior is normal  Judgment and thought content normal          Results:  Labs:  Case Report   Surgical Pathology Report                         Case: [id number]                                    Authorizing Provider: Radha Whitaker MD        Collected:           10/03/2019 0855               Ordering Location:     [address]      Received:            10/03/2019 0900                                      Hospital Operating Room                                                       Pathologist:           Dio Vicente DO                                                       Specimen:    Parathyroid, right inferior parathyroid?                                                   Final Diagnosis   A  Parathyroid, Right Inferior, Parathyroidectomy:  - Parathyroid adenoma, 0 47 grams  Electronically signed by Dio Vicente DO on 10/8/2019 at  1:59 PM         Imaging  No results found    I reviewed the above laboratory and imaging data     Discussion/Summary:  67-year-old woman status post right MIP  She is doing well postoperatively  Plan on 6 month follow-up with repeat calcium blood work to assess for durability of procedure

## 2019-10-18 NOTE — LETTER
October 18, 2019     Dunia Patton, [address] [facility]    Patient: Meghana Rodriguez   YOB: 1945   Date of Visit: 10/18/2019       Dear Dr Liliane Gonzales: Thank you for referring Fanny Madrid to me for evaluation  Below are my notes for this consultation  If you have questions, please do not hesitate to call me  I look forward to following your patient along with you  Sincerely,        Jyoti Cohn MD        CC: MD Jyoti Hernandez MD  10/18/2019  8:48 AM  Sign at close encounter     Surgical Oncology Follow Up       [address]  1945  [id number]  [address] [facility] SURG[facility] ONCOLOGY [address] [address]    Chief Complaint   Patient presents with    Follow-up       Assessment/Plan:    No problem-specific Assessment & Plan notes found for this encounter  Diagnoses and all orders for this visit:    Status post parathyroidectomy  -     Calcium; Future  -     PTH, intact; Future    Other orders  -     meloxicam (MOBIC) 15 mg tablet; Take 15 mg by mouth daily        Advance Care Planning/Advance Directives:  Discussed disease status, cancer treatment plans and/or cancer treatment goals with the patient  No history exists  History of Present Illness:  Patient is a 67-year-old woman here for postop check status post right MIP   -Interval History:  She has felt somewhat better since her procedure  Less fatigued noted  She still has achiness in her joints but has history of feet and back problems  Review of Systems:  Review of Systems   Constitutional: Negative  HENT: Negative  Eyes: Negative  Respiratory: Negative  Cardiovascular: Negative  Gastrointestinal: Negative  Endocrine: Negative  Genitourinary: Negative      Musculoskeletal: Positive for arthralgias and myalgias  Skin: Negative  Allergic/Immunologic: Negative  Neurological: Negative  Hematological: Negative  Psychiatric/Behavioral: Negative  Patient Active Problem List   Diagnosis    UTI (urinary tract infection)    Hydronephrosis    Accelerated hypertension    Calculus of ureter    Hypercalcemia    Primary hyperparathyroidism ([address] )    Status post parathyroidectomy     Past Medical History:   Diagnosis Date    Kidney stones      Past Surgical History:   Procedure Laterality Date    BACK SURGERY      FOOT SURGERY      [place] EXPLORE PARATHYROID GLANDS Right 10/3/2019    Procedure: MINIMALLY INVASIVE RIGHT PARATHYROIDECTOMY,  INRAOPERATIVE PTH MONITORING;  Surgeon: Rahel Xie MD;  [facility];  Service: Surgical Oncology     No family history on file    Social History     Socioeconomic History    Marital status: /Civil Union     Spouse name: Not on file    Number of children: Not on file    Years of education: Not on file    Highest education level: Not on file   Occupational History    Occupation: advisor   part time  [employer] [address] resource strain: Not on file    Food insecurity:     Worry: Not on file     Inability: Not on file    Transportation needs:     Medical: Not on file     Non-medical: Not on file   Tobacco Use    Smoking status: Never Smoker    Smokeless tobacco: Never Used   Substance and Sexual Activity    Alcohol use: Not Currently    Drug use: No    Sexual activity: Not on file   Lifestyle    Physical activity:     Days per week: Not on file     Minutes per session: Not on file    Stress: Not on file   Relationships    Social connections:     Talks on phone: Not on file     Gets together: Not on file     Attends Latter day service: Not on file     Active member of club or organization: Not on file     Attends meetings of clubs or organizations: Not on file     Relationship status: Not on file    Intimate partner violence:     Fear of current or ex partner: Not on file     Emotionally abused: Not on file     Physically abused: Not on file     Forced sexual activity: Not on file   Other Topics Concern    Not on file   Social History Narrative    Not on file       Current Outpatient Medications:     cholecalciferol (VITAMIN D3) 1,000 units tablet, Take 1,000 Units by mouth daily, Disp: , Rfl:     meloxicam (MOBIC) 15 mg tablet, Take 15 mg by mouth daily, Disp: , Rfl:     traMADol (ULTRAM) 50 mg tablet, Take 1 tablet (50 mg total) by mouth every 6 (six) hours as needed for moderate pain (Patient not taking: Reported on 10/18/2019), Disp: 10 tablet, Rfl: 0  Allergies   Allergen Reactions    Sulfa Antibiotics Anaphylaxis     Vitals:    10/18/19 0826   BP: 140/80   Pulse: 78   Resp: 16   Temp: 97 5 °F (36 4 °C)       Physical Exam   Constitutional: She is oriented to person, place, and time  She appears well-developed and well-nourished  HENT:   Head: Normocephalic and atraumatic  Right Ear: External ear normal    Left Ear: External ear normal    Eyes: Pupils are equal, round, and reactive to light  EOM are normal    Neck: Normal range of motion  Neck supple  Cardiovascular: Normal rate and regular rhythm  Pulmonary/Chest: Effort normal and breath sounds normal    Abdominal: Soft  Bowel sounds are normal    Musculoskeletal: Normal range of motion  Neurological: She is alert and oriented to person, place, and time  Skin: Skin is warm and dry  Psychiatric: She has a normal mood and affect   Her behavior is normal  Judgment and thought content normal          Results:  Labs:  Case Report   Surgical Pathology Report                         Case: [id number]                                    Authorizing Provider: Kenneth Anand MD        Collected:           10/03/2019 0855               Ordering Location:     [address] [place] [address]      Received:            10/03/2019 0900                                      Hospital Operating Room                                                       Pathologist:           Jessica Harris DO                                                       Specimen:    Parathyroid, right inferior parathyroid?                                                   Final Diagnosis   A  Parathyroid, Right Inferior, Parathyroidectomy:  - Parathyroid adenoma, 0 47 grams  Electronically signed by Jessica Harris DO on 10/8/2019 at  1:59 PM         Imaging  No results found  I reviewed the above laboratory and imaging data  Discussion/Summary:  55-year-old woman status post right MIP  She is doing well postoperatively  Plan on 6 month follow-up with repeat calcium blood work to assess for durability of procedure

## 2020-04-13 ENCOUNTER — TELEPHONE (OUTPATIENT)
Dept: SURGICAL ONCOLOGY | Facility: CLINIC | Age: 75
End: 2020-04-13

## 2020-05-12 ENCOUNTER — TELEPHONE (OUTPATIENT)
Dept: SURGICAL ONCOLOGY | Facility: CLINIC | Age: 75
End: 2020-05-12

## 2020-05-18 ENCOUNTER — TELEPHONE (OUTPATIENT)
Dept: SURGICAL ONCOLOGY | Facility: CLINIC | Age: 75
End: 2020-05-18

## 2020-05-20 ENCOUNTER — APPOINTMENT (OUTPATIENT)
Dept: LAB | Facility: HOSPITAL | Age: 75
End: 2020-05-20
Attending: SURGERY
Payer: MEDICARE

## 2020-05-20 DIAGNOSIS — E89.2 STATUS POST PARATHYROIDECTOMY (HCC): ICD-10-CM

## 2020-05-20 LAB
CALCIUM SERPL-MCNC: 9.1 MG/DL (ref 8.3–10.1)
PTH-INTACT SERPL-MCNC: 37.6 PG/ML (ref 18.4–80.1)

## 2020-05-20 PROCEDURE — 82310 ASSAY OF CALCIUM: CPT

## 2020-05-20 PROCEDURE — 83970 ASSAY OF PARATHORMONE: CPT

## 2020-05-20 PROCEDURE — 36415 COLL VENOUS BLD VENIPUNCTURE: CPT

## 2020-05-29 ENCOUNTER — OFFICE VISIT (OUTPATIENT)
Dept: SURGICAL ONCOLOGY | Facility: CLINIC | Age: 75
End: 2020-05-29
Payer: MEDICARE

## 2020-05-29 ENCOUNTER — TELEPHONE (OUTPATIENT)
Dept: SURGICAL ONCOLOGY | Facility: CLINIC | Age: 75
End: 2020-05-29

## 2020-05-29 VITALS
DIASTOLIC BLOOD PRESSURE: 80 MMHG | HEART RATE: 70 BPM | TEMPERATURE: 98.3 F | HEIGHT: 64 IN | SYSTOLIC BLOOD PRESSURE: 150 MMHG | BODY MASS INDEX: 32.44 KG/M2 | RESPIRATION RATE: 16 BRPM

## 2020-05-29 DIAGNOSIS — E89.2 STATUS POST PARATHYROIDECTOMY (HCC): Primary | ICD-10-CM

## 2020-05-29 PROCEDURE — 99213 OFFICE O/P EST LOW 20 MIN: CPT | Performed by: SURGERY

## 2020-10-05 ENCOUNTER — TRANSCRIBE ORDERS (OUTPATIENT)
Dept: ADMINISTRATIVE | Facility: HOSPITAL | Age: 75
End: 2020-10-05

## 2020-10-05 ENCOUNTER — APPOINTMENT (OUTPATIENT)
Dept: LAB | Facility: HOSPITAL | Age: 75
End: 2020-10-05
Attending: PLASTIC SURGERY
Payer: MEDICARE

## 2020-10-05 ENCOUNTER — HOSPITAL ENCOUNTER (OUTPATIENT)
Dept: NON INVASIVE DIAGNOSTICS | Facility: HOSPITAL | Age: 75
Discharge: HOME | End: 2020-10-05
Attending: PLASTIC SURGERY
Payer: MEDICARE

## 2020-10-05 DIAGNOSIS — D48.5 NEOPLASM OF UNCERTAIN BEHAVIOR OF SKIN: ICD-10-CM

## 2020-10-05 DIAGNOSIS — Z01.818 OTHER SPECIFIED PRE-OPERATIVE EXAMINATION: ICD-10-CM

## 2020-10-05 DIAGNOSIS — Z01.812 PRE-OPERATIVE LABORATORY EXAMINATION: ICD-10-CM

## 2020-10-05 DIAGNOSIS — D48.5 NEOPLASM OF UNCERTAIN BEHAVIOR OF SKIN: Primary | ICD-10-CM

## 2020-10-05 LAB
ANION GAP SERPL CALCULATED.3IONS-SCNC: 7 MMOL/L (ref 4–13)
ATRIAL RATE: 64 BPM
BUN SERPL-MCNC: 18 MG/DL (ref 5–25)
CALCIUM SERPL-MCNC: 8.9 MG/DL (ref 8.3–10.1)
CHLORIDE SERPL-SCNC: 105 MMOL/L (ref 100–108)
CO2 SERPL-SCNC: 27 MMOL/L (ref 21–32)
CREAT SERPL-MCNC: 0.54 MG/DL (ref 0.6–1.3)
ERYTHROCYTE [DISTWIDTH] IN BLOOD BY AUTOMATED COUNT: 12 % (ref 11.6–15.1)
GFR SERPL CREATININE-BSD FRML MDRD: 93 ML/MIN/1.73SQ M
GLUCOSE P FAST SERPL-MCNC: 89 MG/DL (ref 65–99)
HCT VFR BLD AUTO: 39.6 % (ref 34.8–46.1)
HGB BLD-MCNC: 12.7 G/DL (ref 11.5–15.4)
MCH RBC QN AUTO: 29.3 PG (ref 26.8–34.3)
MCHC RBC AUTO-ENTMCNC: 32.1 G/DL (ref 31.4–37.4)
MCV RBC AUTO: 91 FL (ref 82–98)
P AXIS: 17 DEGREES
PLATELET # BLD AUTO: 233 THOUSANDS/UL (ref 149–390)
PMV BLD AUTO: 10 FL (ref 8.9–12.7)
POTASSIUM SERPL-SCNC: 4.1 MMOL/L (ref 3.5–5.3)
PR INTERVAL: 164 MS
QRS AXIS: -12 DEGREES
QRSD INTERVAL: 106 MS
QT INTERVAL: 390 MS
QTC INTERVAL: 402 MS
RBC # BLD AUTO: 4.34 MILLION/UL (ref 3.81–5.12)
SODIUM SERPL-SCNC: 139 MMOL/L (ref 136–145)
T WAVE AXIS: 42 DEGREES
VENTRICULAR RATE: 64 BPM
WBC # BLD AUTO: 3.74 THOUSAND/UL (ref 4.31–10.16)

## 2020-10-05 PROCEDURE — 36415 COLL VENOUS BLD VENIPUNCTURE: CPT

## 2020-10-05 PROCEDURE — 93010 ELECTROCARDIOGRAM REPORT: CPT | Performed by: INTERNAL MEDICINE

## 2020-10-05 PROCEDURE — 85027 COMPLETE CBC AUTOMATED: CPT

## 2020-10-05 PROCEDURE — 93005 ELECTROCARDIOGRAM TRACING: CPT

## 2020-10-05 PROCEDURE — 80048 BASIC METABOLIC PNL TOTAL CA: CPT

## 2020-10-07 RX ORDER — IBUPROFEN 200 MG
200-800 TABLET ORAL EVERY 6 HOURS PRN
COMMUNITY

## 2020-10-07 RX ORDER — ACETAMINOPHEN 325 MG/1
650 TABLET ORAL EVERY 6 HOURS PRN
COMMUNITY

## 2020-10-08 ENCOUNTER — ANESTHESIA EVENT (OUTPATIENT)
Dept: PERIOP | Facility: HOSPITAL | Age: 75
End: 2020-10-08
Payer: MEDICARE

## 2020-10-09 ENCOUNTER — ANESTHESIA (OUTPATIENT)
Dept: PERIOP | Facility: HOSPITAL | Age: 75
End: 2020-10-09
Payer: MEDICARE

## 2020-10-09 ENCOUNTER — HOSPITAL ENCOUNTER (OUTPATIENT)
Facility: HOSPITAL | Age: 75
Setting detail: OUTPATIENT SURGERY
Discharge: HOME | End: 2020-10-09
Attending: PLASTIC SURGERY | Admitting: PLASTIC SURGERY
Payer: MEDICARE

## 2020-10-09 VITALS
HEART RATE: 68 BPM | TEMPERATURE: 97.5 F | BODY MASS INDEX: 32.27 KG/M2 | DIASTOLIC BLOOD PRESSURE: 84 MMHG | WEIGHT: 189 LBS | SYSTOLIC BLOOD PRESSURE: 172 MMHG | OXYGEN SATURATION: 96 % | HEIGHT: 64 IN | RESPIRATION RATE: 16 BRPM

## 2020-10-09 VITALS — HEART RATE: 64 BPM

## 2020-10-09 DIAGNOSIS — C44.529 SQUAMOUS CELL CARCINOMA OF SKIN OF OTHER PART OF TRUNK: ICD-10-CM

## 2020-10-09 PROCEDURE — 88305 TISSUE EXAM BY PATHOLOGIST: CPT | Performed by: PATHOLOGY

## 2020-10-09 RX ORDER — SODIUM CHLORIDE 9 MG/ML
125 INJECTION, SOLUTION INTRAVENOUS CONTINUOUS
Status: DISCONTINUED | OUTPATIENT
Start: 2020-10-09 | End: 2020-10-09 | Stop reason: HOSPADM

## 2020-10-09 RX ORDER — ONDANSETRON 2 MG/ML
4 INJECTION INTRAMUSCULAR; INTRAVENOUS ONCE AS NEEDED
Status: DISCONTINUED | OUTPATIENT
Start: 2020-10-09 | End: 2020-10-09 | Stop reason: HOSPADM

## 2020-10-09 RX ORDER — PROPOFOL 10 MG/ML
INJECTION, EMULSION INTRAVENOUS CONTINUOUS PRN
Status: DISCONTINUED | OUTPATIENT
Start: 2020-10-09 | End: 2020-10-09

## 2020-10-09 RX ORDER — CEFAZOLIN SODIUM 1 G/50ML
1000 SOLUTION INTRAVENOUS
Status: COMPLETED | OUTPATIENT
Start: 2020-10-09 | End: 2020-10-09

## 2020-10-09 RX ORDER — ONDANSETRON 2 MG/ML
INJECTION INTRAMUSCULAR; INTRAVENOUS AS NEEDED
Status: DISCONTINUED | OUTPATIENT
Start: 2020-10-09 | End: 2020-10-09

## 2020-10-09 RX ORDER — ONDANSETRON 2 MG/ML
4 INJECTION INTRAMUSCULAR; INTRAVENOUS EVERY 8 HOURS PRN
Status: DISCONTINUED | OUTPATIENT
Start: 2020-10-09 | End: 2020-10-09 | Stop reason: HOSPADM

## 2020-10-09 RX ORDER — LIDOCAINE HYDROCHLORIDE AND EPINEPHRINE 10; 10 MG/ML; UG/ML
INJECTION, SOLUTION INFILTRATION; PERINEURAL AS NEEDED
Status: DISCONTINUED | OUTPATIENT
Start: 2020-10-09 | End: 2020-10-09 | Stop reason: HOSPADM

## 2020-10-09 RX ORDER — FENTANYL CITRATE 50 UG/ML
INJECTION, SOLUTION INTRAMUSCULAR; INTRAVENOUS AS NEEDED
Status: DISCONTINUED | OUTPATIENT
Start: 2020-10-09 | End: 2020-10-09

## 2020-10-09 RX ORDER — FENTANYL CITRATE/PF 50 MCG/ML
25 SYRINGE (ML) INJECTION
Status: DISCONTINUED | OUTPATIENT
Start: 2020-10-09 | End: 2020-10-09 | Stop reason: HOSPADM

## 2020-10-09 RX ORDER — HYDROCODONE BITARTRATE AND ACETAMINOPHEN 5; 325 MG/1; MG/1
2 TABLET ORAL EVERY 4 HOURS PRN
Status: DISCONTINUED | OUTPATIENT
Start: 2020-10-09 | End: 2020-10-09 | Stop reason: HOSPADM

## 2020-10-09 RX ADMIN — SODIUM CHLORIDE: 0.9 INJECTION, SOLUTION INTRAVENOUS at 07:59

## 2020-10-09 RX ADMIN — SODIUM CHLORIDE 125 ML/HR: 0.9 INJECTION, SOLUTION INTRAVENOUS at 05:56

## 2020-10-09 RX ADMIN — FENTANYL CITRATE 50 MCG: 50 INJECTION, SOLUTION INTRAMUSCULAR; INTRAVENOUS at 07:47

## 2020-10-09 RX ADMIN — PROPOFOL 120 MCG/KG/MIN: 10 INJECTION, EMULSION INTRAVENOUS at 07:32

## 2020-10-09 RX ADMIN — CEFAZOLIN SODIUM 2000 MG: 1 SOLUTION INTRAVENOUS at 07:09

## 2020-10-09 RX ADMIN — FENTANYL CITRATE 50 MCG: 50 INJECTION, SOLUTION INTRAMUSCULAR; INTRAVENOUS at 07:32

## 2020-10-09 RX ADMIN — ONDANSETRON 4 MG: 2 INJECTION INTRAMUSCULAR; INTRAVENOUS at 07:48

## 2021-02-12 DIAGNOSIS — Z23 ENCOUNTER FOR IMMUNIZATION: ICD-10-CM

## 2021-04-17 ENCOUNTER — TRANSCRIBE ORDERS (OUTPATIENT)
Dept: ADMINISTRATIVE | Facility: HOSPITAL | Age: 76
End: 2021-04-17

## 2021-04-17 ENCOUNTER — APPOINTMENT (OUTPATIENT)
Dept: LAB | Facility: MEDICAL CENTER | Age: 76
End: 2021-04-17
Payer: MEDICARE

## 2021-04-17 DIAGNOSIS — Z00.00 ROUTINE GENERAL MEDICAL EXAMINATION AT A HEALTH CARE FACILITY: ICD-10-CM

## 2021-04-17 DIAGNOSIS — I10 ESSENTIAL HYPERTENSION, MALIGNANT: Primary | ICD-10-CM

## 2021-04-17 DIAGNOSIS — I10 ESSENTIAL HYPERTENSION, MALIGNANT: ICD-10-CM

## 2021-04-17 LAB
ALBUMIN SERPL BCP-MCNC: 3.3 G/DL (ref 3.5–5)
ALP SERPL-CCNC: 100 U/L (ref 46–116)
ALT SERPL W P-5'-P-CCNC: 28 U/L (ref 12–78)
ANION GAP SERPL CALCULATED.3IONS-SCNC: 4 MMOL/L (ref 4–13)
AST SERPL W P-5'-P-CCNC: 22 U/L (ref 5–45)
BILIRUB SERPL-MCNC: 0.76 MG/DL (ref 0.2–1)
BUN SERPL-MCNC: 14 MG/DL (ref 5–25)
CALCIUM ALBUM COR SERPL-MCNC: 9.4 MG/DL (ref 8.3–10.1)
CALCIUM SERPL-MCNC: 8.8 MG/DL (ref 8.3–10.1)
CHLORIDE SERPL-SCNC: 109 MMOL/L (ref 100–108)
CHOLEST SERPL-MCNC: 182 MG/DL (ref 50–200)
CO2 SERPL-SCNC: 27 MMOL/L (ref 21–32)
CREAT SERPL-MCNC: 0.57 MG/DL (ref 0.6–1.3)
ERYTHROCYTE [DISTWIDTH] IN BLOOD BY AUTOMATED COUNT: 12.3 % (ref 11.6–15.1)
EST. AVERAGE GLUCOSE BLD GHB EST-MCNC: 103 MG/DL
GFR SERPL CREATININE-BSD FRML MDRD: 91 ML/MIN/1.73SQ M
GLUCOSE P FAST SERPL-MCNC: 90 MG/DL (ref 65–99)
HBA1C MFR BLD: 5.2 %
HCT VFR BLD AUTO: 39.3 % (ref 34.8–46.1)
HDLC SERPL-MCNC: 57 MG/DL
HGB BLD-MCNC: 12.9 G/DL (ref 11.5–15.4)
LDLC SERPL CALC-MCNC: 108 MG/DL (ref 0–100)
MCH RBC QN AUTO: 29.1 PG (ref 26.8–34.3)
MCHC RBC AUTO-ENTMCNC: 32.8 G/DL (ref 31.4–37.4)
MCV RBC AUTO: 89 FL (ref 82–98)
NONHDLC SERPL-MCNC: 125 MG/DL
PLATELET # BLD AUTO: 257 THOUSANDS/UL (ref 149–390)
PMV BLD AUTO: 9.7 FL (ref 8.9–12.7)
POTASSIUM SERPL-SCNC: 3.9 MMOL/L (ref 3.5–5.3)
PROT SERPL-MCNC: 6.8 G/DL (ref 6.4–8.2)
RBC # BLD AUTO: 4.44 MILLION/UL (ref 3.81–5.12)
SODIUM SERPL-SCNC: 140 MMOL/L (ref 136–145)
TRIGL SERPL-MCNC: 83 MG/DL
TSH SERPL DL<=0.05 MIU/L-ACNC: 1.05 UIU/ML (ref 0.36–3.74)
WBC # BLD AUTO: 4.05 THOUSAND/UL (ref 4.31–10.16)

## 2021-04-17 PROCEDURE — 80061 LIPID PANEL: CPT

## 2021-04-17 PROCEDURE — 84443 ASSAY THYROID STIM HORMONE: CPT

## 2021-04-17 PROCEDURE — 36415 COLL VENOUS BLD VENIPUNCTURE: CPT

## 2021-04-17 PROCEDURE — 83036 HEMOGLOBIN GLYCOSYLATED A1C: CPT

## 2021-04-17 PROCEDURE — 85027 COMPLETE CBC AUTOMATED: CPT

## 2021-04-17 PROCEDURE — 80053 COMPREHEN METABOLIC PANEL: CPT

## 2021-10-29 ENCOUNTER — HOSPITAL ENCOUNTER (EMERGENCY)
Facility: HOSPITAL | Age: 76
Discharge: HOME | End: 2021-10-29
Attending: EMERGENCY MEDICINE | Admitting: EMERGENCY MEDICINE
Payer: MEDICARE

## 2021-10-29 ENCOUNTER — APPOINTMENT (EMERGENCY)
Dept: CT IMAGING | Facility: HOSPITAL | Age: 76
End: 2021-10-29
Payer: MEDICARE

## 2021-10-29 VITALS
DIASTOLIC BLOOD PRESSURE: 77 MMHG | BODY MASS INDEX: 32.17 KG/M2 | OXYGEN SATURATION: 98 % | SYSTOLIC BLOOD PRESSURE: 195 MMHG | RESPIRATION RATE: 18 BRPM | WEIGHT: 187.39 LBS | HEART RATE: 68 BPM | TEMPERATURE: 98.1 F

## 2021-10-29 DIAGNOSIS — R03.0 ELEVATED BLOOD PRESSURE READING: ICD-10-CM

## 2021-10-29 DIAGNOSIS — R10.9 ACUTE ABDOMINAL PAIN: ICD-10-CM

## 2021-10-29 DIAGNOSIS — N39.0 UTI (URINARY TRACT INFECTION): Primary | ICD-10-CM

## 2021-10-29 LAB
ALBUMIN SERPL BCP-MCNC: 3.4 G/DL (ref 3.5–5)
ALP SERPL-CCNC: 117 U/L (ref 46–116)
ALT SERPL W P-5'-P-CCNC: 37 U/L (ref 12–78)
ANION GAP SERPL CALCULATED.3IONS-SCNC: 11 MMOL/L (ref 4–13)
AST SERPL W P-5'-P-CCNC: 28 U/L (ref 5–45)
BACTERIA UR QL AUTO: ABNORMAL /HPF
BASOPHILS # BLD AUTO: 0.02 THOUSANDS/ΜL (ref 0–0.1)
BASOPHILS NFR BLD AUTO: 0 % (ref 0–1)
BILIRUB DIRECT SERPL-MCNC: 0.15 MG/DL (ref 0–0.2)
BILIRUB SERPL-MCNC: 0.57 MG/DL (ref 0.2–1)
BILIRUB UR QL STRIP: NEGATIVE
BUN SERPL-MCNC: 17 MG/DL (ref 5–25)
CALCIUM SERPL-MCNC: 9.2 MG/DL (ref 8.3–10.1)
CHLORIDE SERPL-SCNC: 104 MMOL/L (ref 100–108)
CLARITY UR: CLEAR
CO2 SERPL-SCNC: 27 MMOL/L (ref 21–32)
COLOR UR: YELLOW
CREAT SERPL-MCNC: 0.62 MG/DL (ref 0.6–1.3)
EOSINOPHIL # BLD AUTO: 0.14 THOUSAND/ΜL (ref 0–0.61)
EOSINOPHIL NFR BLD AUTO: 2 % (ref 0–6)
ERYTHROCYTE [DISTWIDTH] IN BLOOD BY AUTOMATED COUNT: 11.9 % (ref 11.6–15.1)
GFR SERPL CREATININE-BSD FRML MDRD: 88 ML/MIN/1.73SQ M
GLUCOSE SERPL-MCNC: 88 MG/DL (ref 65–140)
GLUCOSE UR STRIP-MCNC: NEGATIVE MG/DL
HCT VFR BLD AUTO: 39.2 % (ref 34.8–46.1)
HGB BLD-MCNC: 13.5 G/DL (ref 11.5–15.4)
HGB UR QL STRIP.AUTO: NEGATIVE
IMM GRANULOCYTES # BLD AUTO: 0.02 THOUSAND/UL (ref 0–0.2)
IMM GRANULOCYTES NFR BLD AUTO: 0 % (ref 0–2)
KETONES UR STRIP-MCNC: NEGATIVE MG/DL
LEUKOCYTE ESTERASE UR QL STRIP: ABNORMAL
LIPASE SERPL-CCNC: 67 U/L (ref 73–393)
LYMPHOCYTES # BLD AUTO: 1.41 THOUSANDS/ΜL (ref 0.6–4.47)
LYMPHOCYTES NFR BLD AUTO: 20 % (ref 14–44)
MCH RBC QN AUTO: 30.2 PG (ref 26.8–34.3)
MCHC RBC AUTO-ENTMCNC: 34.4 G/DL (ref 31.4–37.4)
MCV RBC AUTO: 88 FL (ref 82–98)
MONOCYTES # BLD AUTO: 0.57 THOUSAND/ΜL (ref 0.17–1.22)
MONOCYTES NFR BLD AUTO: 8 % (ref 4–12)
NEUTROPHILS # BLD AUTO: 4.78 THOUSANDS/ΜL (ref 1.85–7.62)
NEUTS SEG NFR BLD AUTO: 70 % (ref 43–75)
NITRITE UR QL STRIP: NEGATIVE
NON-SQ EPI CELLS URNS QL MICRO: ABNORMAL /HPF
NRBC BLD AUTO-RTO: 0 /100 WBCS
PH UR STRIP.AUTO: 7 [PH] (ref 4.5–8)
PLATELET # BLD AUTO: 229 THOUSANDS/UL (ref 149–390)
PMV BLD AUTO: 9.4 FL (ref 8.9–12.7)
POTASSIUM SERPL-SCNC: 3.8 MMOL/L (ref 3.5–5.3)
PROT SERPL-MCNC: 7.1 G/DL (ref 6.4–8.2)
PROT UR STRIP-MCNC: NEGATIVE MG/DL
RBC # BLD AUTO: 4.47 MILLION/UL (ref 3.81–5.12)
RBC #/AREA URNS AUTO: ABNORMAL /HPF
SODIUM SERPL-SCNC: 142 MMOL/L (ref 136–145)
SP GR UR STRIP.AUTO: 1.02 (ref 1–1.03)
UROBILINOGEN UR QL STRIP.AUTO: 0.2 E.U./DL
WBC # BLD AUTO: 6.94 THOUSAND/UL (ref 4.31–10.16)
WBC #/AREA URNS AUTO: ABNORMAL /HPF

## 2021-10-29 PROCEDURE — 99284 EMERGENCY DEPT VISIT MOD MDM: CPT

## 2021-10-29 PROCEDURE — 74177 CT ABD & PELVIS W/CONTRAST: CPT

## 2021-10-29 PROCEDURE — 83690 ASSAY OF LIPASE: CPT | Performed by: EMERGENCY MEDICINE

## 2021-10-29 PROCEDURE — 81001 URINALYSIS AUTO W/SCOPE: CPT

## 2021-10-29 PROCEDURE — 99285 EMERGENCY DEPT VISIT HI MDM: CPT | Performed by: EMERGENCY MEDICINE

## 2021-10-29 PROCEDURE — 36415 COLL VENOUS BLD VENIPUNCTURE: CPT | Performed by: EMERGENCY MEDICINE

## 2021-10-29 PROCEDURE — 96374 THER/PROPH/DIAG INJ IV PUSH: CPT

## 2021-10-29 PROCEDURE — 96375 TX/PRO/DX INJ NEW DRUG ADDON: CPT

## 2021-10-29 PROCEDURE — 85025 COMPLETE CBC W/AUTO DIFF WBC: CPT | Performed by: EMERGENCY MEDICINE

## 2021-10-29 PROCEDURE — 80076 HEPATIC FUNCTION PANEL: CPT | Performed by: EMERGENCY MEDICINE

## 2021-10-29 PROCEDURE — 80048 BASIC METABOLIC PNL TOTAL CA: CPT | Performed by: EMERGENCY MEDICINE

## 2021-10-29 PROCEDURE — 87086 URINE CULTURE/COLONY COUNT: CPT

## 2021-10-29 PROCEDURE — G1004 CDSM NDSC: HCPCS

## 2021-10-29 RX ORDER — HYDROMORPHONE HCL/PF 1 MG/ML
0.2 SYRINGE (ML) INJECTION ONCE
Status: COMPLETED | OUTPATIENT
Start: 2021-10-29 | End: 2021-10-29

## 2021-10-29 RX ORDER — ONDANSETRON 2 MG/ML
4 INJECTION INTRAMUSCULAR; INTRAVENOUS ONCE
Status: COMPLETED | OUTPATIENT
Start: 2021-10-29 | End: 2021-10-29

## 2021-10-29 RX ORDER — KETOROLAC TROMETHAMINE 30 MG/ML
15 INJECTION, SOLUTION INTRAMUSCULAR; INTRAVENOUS ONCE
Status: COMPLETED | OUTPATIENT
Start: 2021-10-29 | End: 2021-10-29

## 2021-10-29 RX ORDER — CEFUROXIME AXETIL 250 MG/1
250 TABLET ORAL EVERY 12 HOURS SCHEDULED
Qty: 14 TABLET | Refills: 0 | Status: SHIPPED | OUTPATIENT
Start: 2021-10-29 | End: 2021-11-05

## 2021-10-29 RX ORDER — HYDROMORPHONE HCL/PF 1 MG/ML
0.2 SYRINGE (ML) INJECTION
Status: DISCONTINUED | OUTPATIENT
Start: 2021-10-29 | End: 2021-10-29 | Stop reason: HOSPADM

## 2021-10-29 RX ADMIN — KETOROLAC TROMETHAMINE 15 MG: 30 INJECTION, SOLUTION INTRAMUSCULAR at 15:24

## 2021-10-29 RX ADMIN — IOHEXOL 100 ML: 350 INJECTION, SOLUTION INTRAVENOUS at 15:54

## 2021-10-29 RX ADMIN — HYDROMORPHONE HYDROCHLORIDE 0.2 MG: 1 INJECTION, SOLUTION INTRAMUSCULAR; INTRAVENOUS; SUBCUTANEOUS at 16:11

## 2021-10-29 RX ADMIN — ONDANSETRON 4 MG: 2 INJECTION INTRAMUSCULAR; INTRAVENOUS at 15:24

## 2021-10-31 LAB — BACTERIA UR CULT: NORMAL

## 2022-05-26 ENCOUNTER — HOSPITAL ENCOUNTER (OUTPATIENT)
Dept: MRI IMAGING | Facility: HOSPITAL | Age: 77
Discharge: HOME | End: 2022-05-26
Payer: MEDICARE

## 2022-05-26 DIAGNOSIS — M54.12 RADICULOPATHY, CERVICAL REGION: ICD-10-CM

## 2022-05-26 PROCEDURE — 72141 MRI NECK SPINE W/O DYE: CPT

## 2022-11-19 ENCOUNTER — APPOINTMENT (EMERGENCY)
Dept: RADIOLOGY | Facility: HOSPITAL | Age: 77
End: 2022-11-19

## 2022-11-19 ENCOUNTER — HOSPITAL ENCOUNTER (EMERGENCY)
Facility: HOSPITAL | Age: 77
Discharge: HOME | End: 2022-11-19
Attending: EMERGENCY MEDICINE

## 2022-11-19 VITALS
RESPIRATION RATE: 16 BRPM | SYSTOLIC BLOOD PRESSURE: 144 MMHG | OXYGEN SATURATION: 98 % | HEART RATE: 84 BPM | DIASTOLIC BLOOD PRESSURE: 68 MMHG | TEMPERATURE: 98.6 F

## 2022-11-19 DIAGNOSIS — J18.9 PNEUMONIA: ICD-10-CM

## 2022-11-19 DIAGNOSIS — J10.1 INFLUENZA A: Primary | ICD-10-CM

## 2022-11-19 LAB
2HR DELTA HS TROPONIN: 0 NG/L
ALBUMIN SERPL BCP-MCNC: 2.8 G/DL (ref 3.5–5)
ALP SERPL-CCNC: 125 U/L (ref 46–116)
ALT SERPL W P-5'-P-CCNC: 39 U/L (ref 12–78)
ANION GAP SERPL CALCULATED.3IONS-SCNC: 8 MMOL/L (ref 4–13)
APTT PPP: 25 SECONDS (ref 23–37)
AST SERPL W P-5'-P-CCNC: 26 U/L (ref 5–45)
ATRIAL RATE: 76 BPM
ATRIAL RATE: 85 BPM
BASOPHILS # BLD AUTO: 0.01 THOUSANDS/ÂΜL (ref 0–0.1)
BASOPHILS NFR BLD AUTO: 0 % (ref 0–1)
BILIRUB SERPL-MCNC: 0.62 MG/DL (ref 0.2–1)
BUN SERPL-MCNC: 14 MG/DL (ref 5–25)
CALCIUM ALBUM COR SERPL-MCNC: 9.5 MG/DL (ref 8.3–10.1)
CALCIUM SERPL-MCNC: 8.5 MG/DL (ref 8.3–10.1)
CARDIAC TROPONIN I PNL SERPL HS: 6 NG/L
CARDIAC TROPONIN I PNL SERPL HS: 6 NG/L
CHLORIDE SERPL-SCNC: 105 MMOL/L (ref 96–108)
CO2 SERPL-SCNC: 26 MMOL/L (ref 21–32)
CREAT SERPL-MCNC: 0.53 MG/DL (ref 0.6–1.3)
EOSINOPHIL # BLD AUTO: 0.09 THOUSAND/ÂΜL (ref 0–0.61)
EOSINOPHIL NFR BLD AUTO: 1 % (ref 0–6)
ERYTHROCYTE [DISTWIDTH] IN BLOOD BY AUTOMATED COUNT: 11.8 % (ref 11.6–15.1)
FLUAV RNA RESP QL NAA+PROBE: POSITIVE
FLUBV RNA RESP QL NAA+PROBE: NEGATIVE
GFR SERPL CREATININE-BSD FRML MDRD: 92 ML/MIN/1.73SQ M
GLUCOSE SERPL-MCNC: 122 MG/DL (ref 65–140)
HCT VFR BLD AUTO: 34.5 % (ref 34.8–46.1)
HGB BLD-MCNC: 11.7 G/DL (ref 11.5–15.4)
IMM GRANULOCYTES # BLD AUTO: 0.02 THOUSAND/UL (ref 0–0.2)
IMM GRANULOCYTES NFR BLD AUTO: 0 % (ref 0–2)
INR PPP: 0.98 (ref 0.84–1.19)
LYMPHOCYTES # BLD AUTO: 0.84 THOUSANDS/ÂΜL (ref 0.6–4.47)
LYMPHOCYTES NFR BLD AUTO: 13 % (ref 14–44)
MCH RBC QN AUTO: 29.8 PG (ref 26.8–34.3)
MCHC RBC AUTO-ENTMCNC: 33.9 G/DL (ref 31.4–37.4)
MCV RBC AUTO: 88 FL (ref 82–98)
MONOCYTES # BLD AUTO: 0.5 THOUSAND/ÂΜL (ref 0.17–1.22)
MONOCYTES NFR BLD AUTO: 8 % (ref 4–12)
NEUTROPHILS # BLD AUTO: 4.96 THOUSANDS/ÂΜL (ref 1.85–7.62)
NEUTS SEG NFR BLD AUTO: 78 % (ref 43–75)
NRBC BLD AUTO-RTO: 0 /100 WBCS
P AXIS: 18 DEGREES
P AXIS: 19 DEGREES
PLATELET # BLD AUTO: 212 THOUSANDS/UL (ref 149–390)
PMV BLD AUTO: 9.3 FL (ref 8.9–12.7)
POTASSIUM SERPL-SCNC: 4 MMOL/L (ref 3.5–5.3)
PR INTERVAL: 154 MS
PR INTERVAL: 156 MS
PROT SERPL-MCNC: 6.7 G/DL (ref 6.4–8.4)
PROTHROMBIN TIME: 13 SECONDS (ref 11.6–14.5)
QRS AXIS: -11 DEGREES
QRS AXIS: 9 DEGREES
QRSD INTERVAL: 104 MS
QRSD INTERVAL: 104 MS
QT INTERVAL: 358 MS
QT INTERVAL: 360 MS
QTC INTERVAL: 405 MS
QTC INTERVAL: 426 MS
RBC # BLD AUTO: 3.93 MILLION/UL (ref 3.81–5.12)
RSV RNA RESP QL NAA+PROBE: NEGATIVE
SARS-COV-2 RNA RESP QL NAA+PROBE: NEGATIVE
SODIUM SERPL-SCNC: 139 MMOL/L (ref 135–147)
T WAVE AXIS: 0 DEGREES
T WAVE AXIS: 13 DEGREES
VENTRICULAR RATE: 76 BPM
VENTRICULAR RATE: 85 BPM
WBC # BLD AUTO: 6.42 THOUSAND/UL (ref 4.31–10.16)

## 2022-11-19 RX ORDER — DOXYCYCLINE HYCLATE 100 MG/1
100 CAPSULE ORAL 2 TIMES DAILY
Qty: 14 CAPSULE | Refills: 0 | Status: SHIPPED | OUTPATIENT
Start: 2022-11-19 | End: 2022-11-26

## 2022-11-19 RX ORDER — ALBUTEROL SULFATE 90 UG/1
2 AEROSOL, METERED RESPIRATORY (INHALATION) EVERY 6 HOURS PRN
Qty: 8 G | Refills: 0 | Status: SHIPPED | OUTPATIENT
Start: 2022-11-19 | End: 2022-11-29

## 2022-11-19 RX ORDER — ACETAMINOPHEN 325 MG/1
975 TABLET ORAL ONCE AS NEEDED
Status: COMPLETED | OUTPATIENT
Start: 2022-11-19 | End: 2022-11-19

## 2022-11-19 RX ORDER — DOXYCYCLINE HYCLATE 100 MG/1
100 CAPSULE ORAL 2 TIMES DAILY
Qty: 14 CAPSULE | Refills: 0 | Status: SHIPPED | OUTPATIENT
Start: 2022-11-19 | End: 2022-11-19 | Stop reason: SDUPTHER

## 2022-11-19 RX ORDER — HYDROCODONE POLISTIREX AND CHLORPHENIRAMINE POLISTIREX 10; 8 MG/5ML; MG/5ML
5 SUSPENSION, EXTENDED RELEASE ORAL EVERY 12 HOURS PRN
Qty: 120 ML | Refills: 0 | Status: SHIPPED | OUTPATIENT
Start: 2022-11-19 | End: 2022-11-29

## 2022-11-19 RX ORDER — PROMETHAZINE HYDROCHLORIDE AND CODEINE PHOSPHATE 6.25; 1 MG/5ML; MG/5ML
5 SYRUP ORAL EVERY 6 HOURS PRN
Qty: 118 ML | Refills: 0 | Status: SHIPPED | OUTPATIENT
Start: 2022-11-19 | End: 2022-11-19

## 2022-11-19 RX ORDER — PROMETHAZINE HYDROCHLORIDE AND CODEINE PHOSPHATE 6.25; 1 MG/5ML; MG/5ML
5 SYRUP ORAL EVERY 6 HOURS PRN
Qty: 120 ML | Refills: 0 | Status: SHIPPED | OUTPATIENT
Start: 2022-11-19 | End: 2022-11-19 | Stop reason: SDUPTHER

## 2022-11-19 RX ORDER — LOSARTAN POTASSIUM 50 MG/1
TABLET ORAL
COMMUNITY
Start: 2022-10-18

## 2022-11-19 RX ADMIN — ACETAMINOPHEN 975 MG: 325 TABLET ORAL at 07:10

## 2022-11-19 NOTE — DISCHARGE INSTRUCTIONS
Tylenol or Motrin for fevers/pain  Saline spray for congestion you may use Mucinex for cough and congestion increase, fluids follow-up with the family doctor  Return to the emergency department for worsening symptoms  Doxycyline for the pneumonia

## 2022-11-19 NOTE — ED NOTES
2 hour repeat troponin scanned and sent to lab with 4 hour trop label  Spoke to Dev Brown in the lab, will put 4 hour trop in for redraw  2 hour label sent to lab and confirmed with Dev Brown that specimen collected and sent was 2 hour not 4  Dev Brown understood and agreeable        Preston Styles RN  11/19/22 2903

## 2022-11-23 ENCOUNTER — APPOINTMENT (EMERGENCY)
Dept: RADIOLOGY | Facility: HOSPITAL | Age: 77
End: 2022-11-23

## 2022-11-23 ENCOUNTER — HOSPITAL ENCOUNTER (EMERGENCY)
Facility: HOSPITAL | Age: 77
Discharge: HOME | End: 2022-11-23
Attending: EMERGENCY MEDICINE

## 2022-11-23 VITALS
TEMPERATURE: 98.4 F | OXYGEN SATURATION: 97 % | SYSTOLIC BLOOD PRESSURE: 147 MMHG | RESPIRATION RATE: 20 BRPM | HEART RATE: 66 BPM | DIASTOLIC BLOOD PRESSURE: 67 MMHG

## 2022-11-23 DIAGNOSIS — J18.9 PNEUMONIA: ICD-10-CM

## 2022-11-23 DIAGNOSIS — J10.1 INFLUENZA A: Primary | ICD-10-CM

## 2022-11-23 LAB
ATRIAL RATE: 62 BPM
P AXIS: 41 DEGREES
PR INTERVAL: 188 MS
QRS AXIS: 11 DEGREES
QRSD INTERVAL: 100 MS
QT INTERVAL: 398 MS
QTC INTERVAL: 403 MS
T WAVE AXIS: 57 DEGREES
VENTRICULAR RATE: 62 BPM

## 2022-11-23 RX ORDER — AZELASTINE 1 MG/ML
1 SPRAY, METERED NASAL 2 TIMES DAILY
Qty: 1 ML | Refills: 0 | Status: SHIPPED | OUTPATIENT
Start: 2022-11-23

## 2022-11-23 RX ORDER — BENZONATATE 100 MG/1
100 CAPSULE ORAL EVERY 8 HOURS
Qty: 21 CAPSULE | Refills: 0 | Status: SHIPPED | OUTPATIENT
Start: 2022-11-23

## 2022-11-23 RX ORDER — FLUTICASONE PROPIONATE 50 MCG
1 SPRAY, SUSPENSION (ML) NASAL DAILY
Qty: 16 G | Refills: 0 | Status: SHIPPED | OUTPATIENT
Start: 2022-11-23

## 2022-11-23 NOTE — ED ATTENDING ATTESTATION
11/23/2022  IWiley MD, saw and evaluated the patient  I have discussed the patient with the resident/non-physician practitioner and agree with the resident's/non-physician practitioner's findings, Plan of Care, and MDM as documented in the resident's/non-physician practitioner's note, except where noted  All available labs and Radiology studies were reviewed  I was present for key portions of any procedure(s) performed by the resident/non-physician practitioner and I was immediately available to provide assistance  At this point I agree with the current assessment done in the Emergency Department  I have conducted an independent evaluation of this patient a history and physical is as follows:  69-year-old female who was diagnosed with the flu on November 19th presents for evaluation of persistent cough with associated nausea and dry heaves  She states in the constant, unchanged, without modifying factors  She has tried a hydrocodone cough medication without relief  Ten systems reviewed otherwise negative  Exam no distress, lungs normal, cardiac normal abdomen normal, skin normal pain medicines;-persistent cough-will do chest x-ray, symptomatic treatment, reassurance, counseling    ED Course         Critical Care Time  Procedures

## 2022-11-23 NOTE — Clinical Note
Shasha Hernandez was seen and treated in our emergency department on 11/23/2022  No restrictions    Other - See Comments    N/A    Diagnosis: Influenza, pneumonia    Maximus Caraballo  is off the rest of the shift today, may return to work on return date  She may return on this date: 11/28/2022         If you have any questions or concerns, please don't hesitate to call        Sarah Beth Ramon MD    ______________________________           _______________          _______________  [facility] Representative                              Date                                Time
English

## 2022-11-23 NOTE — ED PROVIDER NOTES
History  Chief Complaint   Patient presents with   • Cough     Pt arrives via ems from work for worsening cough and sob over the past week  Pt was diagnosed with pneumonia here a week ago and doesn't  feel like she is getting better  Pt is taking the  antibiotic and cough medication  The cough did make the pt vomit at work this morning  Pt denies cp      70 y/o female with hx of HTN and psoriasis presents to the ED for evaluation of persisting coughing paroxysms over the last 3-4 days  She was seen in the ED on 11/19/2022 and diagnosed with influenza A and pneumonia, started on antibiotics and cough medication  Patient states that she went to work today and noticed worsening coughing paroxysms followed by an episode of vomiting  She was prompted by co-worker to come the ER for evaluation  She notes that she is feeling okay now that she is not having coughing spells  She denies any chest pain  No other symptoms or complaints  Prior to Admission Medications   Prescriptions Last Dose Informant Patient Reported? Taking?    NON FORMULARY   Yes No   Sig: Medical marijuana drops prn   acetaminophen (Tylenol) 325 mg tablet   Yes No   Sig: Take 650 mg by mouth every 6 (six) hours as needed for mild pain   albuterol (PROVENTIL HFA,VENTOLIN HFA) 90 mcg/act inhaler   No No   Sig: Inhale 2 puffs every 6 (six) hours as needed for wheezing for up to 10 days   cholecalciferol (VITAMIN D3) 1,000 units tablet   Yes No   Sig: Take 1,000 Units by mouth daily   doxycycline hyclate (VIBRAMYCIN) 100 mg capsule   No No   Sig: Take 1 capsule (100 mg total) by mouth 2 (two) times a day for 7 days   hydrocodone-chlorpheniramine polistirex (TUSSIONEX) 10-8 mg/5 mL ER suspension   No No   Sig: Take 5 mL by mouth every 12 (twelve) hours as needed for cough for up to 10 days Max Daily Amount: 10 mL   ibuprofen (MOTRIN) 200 mg tablet   Yes No   Sig: Take 200-800 mg by mouth every 6 (six) hours as needed for mild pain   losartan (COZAAR) 50 mg tablet   Yes No   Sig: losartan 50 mg tablet   TAKE 1 TABLET BY MOUTH EVERY DAY      Facility-Administered Medications: None       Past Medical History:   Diagnosis Date   • Anesthesia complication     aggitation   • Anxiety    • Arthritis    • Hearing loss     hearing  aids prn   • History of lumbar fusion    • Hypertension    • Kidney stones    • Peripheral neuropathy    • Psoriasis     right elbow   • SCC (squamous cell carcinoma)     chest   • Use of cane as ambulatory aid     or walker prn       Past Surgical History:   Procedure Laterality Date   • BACK SURGERY      lumbar fusion   • CARPAL TUNNEL RELEASE Bilateral    • CATARACT EXTRACTION Bilateral    • CHEST WALL BIOPSY N/A 10/9/2020    Procedure: CHEST SCC EXCISION; LOCAL FLAP;  Surgeon: Ritu Martines MD;  Location: [facility];  Service: Plastics   • COLONOSCOPY     • FOOT SURGERY Left     fusion   • LAMINECTOMY     • WY EXPLORE PARATHYROID GLANDS Right 10/3/2019    Procedure: MINIMALLY INVASIVE RIGHT PARATHYROIDECTOMY,  INRAOPERATIVE PTH MONITORING;  Surgeon: Marco Mathew MD;  Location:  MAIN OR;  Service: Surgical Oncology   • ULNAR NERVE TRANSPOSITION     • UTERINE FIBROID SURGERY         History reviewed  No pertinent family history  I have reviewed and agree with the history as documented  E-Cigarette/Vaping   • E-Cigarette Use Never User      E-Cigarette/Vaping Substances   • Nicotine No    • THC No    • CBD No    • Flavoring No    • Other No    • Unknown No      Social History     Tobacco Use   • Smoking status: Some Days     Types: Cigarettes   • Smokeless tobacco: Never   • Tobacco comments:     social smoker- smoked regularly yrs ago   Vaping Use   • Vaping Use: Never used   Substance Use Topics   • Alcohol use: Yes     Comment: wine   • Drug use: No        Review of Systems   Constitutional: Positive for chills and fever  HENT: Negative for congestion, rhinorrhea and sore throat  Respiratory: Positive for cough  Negative for shortness of breath  Cardiovascular: Negative for chest pain and palpitations  Gastrointestinal: Negative for abdominal pain, diarrhea, nausea and vomiting  Genitourinary: Negative for dysuria and hematuria  Musculoskeletal: Negative for back pain and neck pain  Neurological: Negative for dizziness, weakness, light-headedness, numbness and headaches  All other systems reviewed and are negative  Physical Exam  ED Triage Vitals [11/23/22 0924]   Temperature Pulse Respirations Blood Pressure SpO2   98 4 °F (36 9 °C) 66 20 147/67 98 %      Temp src Heart Rate Source Patient Position - Orthostatic VS BP Location FiO2 (%)   -- -- Lying Left arm --      Pain Score       --             Orthostatic Vital Signs  Vitals:    11/23/22 0924   BP: 147/67   Pulse: 66   Patient Position - Orthostatic VS: Lying       Physical Exam  Vitals and nursing note reviewed  Constitutional:       General: She is not in acute distress  Appearance: Normal appearance  She is normal weight  She is not ill-appearing  HENT:      Head: Normocephalic and atraumatic  Right Ear: External ear normal       Left Ear: External ear normal       Nose: Nose normal  No congestion or rhinorrhea  Mouth/Throat:      Mouth: Mucous membranes are moist       Pharynx: Oropharynx is clear  No oropharyngeal exudate or posterior oropharyngeal erythema  Eyes:      Extraocular Movements: Extraocular movements intact  Conjunctiva/sclera: Conjunctivae normal       Pupils: Pupils are equal, round, and reactive to light  Cardiovascular:      Rate and Rhythm: Normal rate and regular rhythm  Pulses: Normal pulses  Heart sounds: Normal heart sounds  No murmur heard  Pulmonary:      Effort: Pulmonary effort is normal  No respiratory distress  Breath sounds: Normal breath sounds  No stridor  No wheezing or rales  Chest:      Chest wall: No tenderness  Abdominal:      General: Abdomen is flat   Bowel sounds are normal  There is no distension  Palpations: Abdomen is soft  Tenderness: There is no abdominal tenderness  There is no right CVA tenderness, left CVA tenderness or guarding  Musculoskeletal:         General: No swelling or tenderness  Normal range of motion  Cervical back: Normal range of motion and neck supple  No tenderness  Skin:     General: Skin is warm and dry  Capillary Refill: Capillary refill takes less than 2 seconds  Neurological:      General: No focal deficit present  Mental Status: She is alert and oriented to person, place, and time  ED Medications  Medications - No data to display    Diagnostic Studies  Results Reviewed     None                 XR chest 2 views   Final Result by Serjio Pickett MD (11/23 1043)      No acute cardiopulmonary disease  Workstation performed: [id number]               Procedures  Procedures      ED Course  ED Course as of 11/26/22 0233   Wed Nov 23, 2022   1044 XR chest 2 views                             SBIRT 22yo+    Flowsheet Row Most Recent Value   SBIRT (23 yo +)    In order to provide better care to our patients, we are screening all of our patients for alcohol and drug use  Would it be okay to ask you these screening questions? Yes Filed at: 11/23/2022 1000   Initial Alcohol Screen: US AUDIT-C     1  How often do you have a drink containing alcohol? 0 Filed at: 11/23/2022 1000   2  How many drinks containing alcohol do you have on a typical day you are drinking? 0 Filed at: 11/23/2022 1000   3a  Male UNDER 65: How often do you have five or more drinks on one occasion? 0 Filed at: 11/23/2022 1000   3b  FEMALE Any Age, or MALE 65+: How often do you have 4 or more drinks on one occassion? 0 Filed at: 11/23/2022 1000   Audit-C Score 0 Filed at: 11/23/2022 1000   BONIFACIO: How many times in the past year have you    Used an illegal drug or used a prescription medication for non-medical reasons?  Never Filed at: 11/23/2022 1000                [facility]  Number of Diagnoses or Management Options  Influenza A  Pneumonia  Diagnosis management comments: This is a 69-year-old female with hx of HTN and psoriasis presenting for evaluation of persisting coughing paroxysms over the last 3-4 days  She was seen in the ED on 11/19/2022 and diagnosed with influenza A and pneumonia, started on antibiotics and cough medication  Patient states that she went to work today and noticed worsening coughing paroxysms followed by an episode of vomiting  She was prompted by co-worker to come the ER for evaluation  She notes that she is feeling okay now that she is not having coughing spells  She denies any chest pain  No other symptoms or complaints  Will evaluate with chest x-ray due to concerns for worsening of coughing paroxysms  Chest x-ray on my interpretation shows no acute cardiopulmonary disease process  Will treat symptomatically for cough, Tessalon Perles, Astelin, and Flonase prescribed  Discussed all findings, treatment, red flags/return precautions, and outpatient follow-up and the patient/family understands and agrees  Stable for discharge  Disposition  Final diagnoses:   Influenza A   Pneumonia     Time reflects when diagnosis was documented in both MDM as applicable and the Disposition within this note     Time User Action Codes Description Comment    11/23/2022 10:45 AM Sirisha Dates Add [J10 1] Influenza A     11/23/2022 10:46 AM Sirisha Dates Add [J18 9] Pneumonia       ED Disposition     ED Disposition   Discharge    Condition   Stable    Date/Time   Wed Nov 23, 2022 10:45 AM    Comment   Reinaldo Lopez discharge to home/self care                 Follow-up Information     Follow up With Specialties Details Why Contact Info Additional [address] [facility] Call in 1 week For follow up [address], [address] [facility] [id number] [phone] [place] [facility], [address], [address] [place], [place], [address] [facility] Emergency Department Emergency Medicine Go to  If symptoms worsen [place] [id number]  [address] [facility] Emergency Department, [facility]  , [place], [place], [address]          Discharge Medication List as of 11/23/2022 10:47 AM      START taking these medications    Details   azelastine (ASTELIN) 0 1 % nasal spray 1 spray into each nostril 2 (two) times a day Use in each nostril as directed, Starting Wed 11/23/2022, Normal      benzonatate (TESSALON PERLES) 100 mg capsule Take 1 capsule (100 mg total) by mouth every 8 (eight) hours, Starting Wed 11/23/2022, Normal      fluticasone (FLONASE) 50 mcg/act nasal spray 1 spray into each nostril daily, Starting Wed 11/23/2022, Normal         CONTINUE these medications which have NOT CHANGED    Details   albuterol (PROVENTIL HFA,VENTOLIN HFA) 90 mcg/act inhaler Inhale 2 puffs every 6 (six) hours as needed for wheezing for up to 10 days, Starting Sat 11/19/2022, Until Tue 11/29/2022 at 2359, Normal      doxycycline hyclate (VIBRAMYCIN) 100 mg capsule Take 1 capsule (100 mg total) by mouth 2 (two) times a day for 7 days, Starting Sat 11/19/2022, Until Sat 11/26/2022, Normal      hydrocodone-chlorpheniramine polistirex (TUSSIONEX) 10-8 mg/5 mL ER suspension Take 5 mL by mouth every 12 (twelve) hours as needed for cough for up to 10 days Max Daily Amount: 10 mL, Starting Sat 11/19/2022, Until Tue 11/29/2022 at 2359, Normal      losartan (COZAAR) 50 mg tablet losartan 50 mg tablet   TAKE 1 TABLET BY MOUTH EVERY DAY, Historical Med      acetaminophen (Tylenol) 325 mg tablet Take 650 mg by mouth every 6 (six) hours as needed for mild pain, Historical Med      cholecalciferol (VITAMIN D3) 1,000 units tablet Take 1,000 Units by mouth daily, Historical Med      ibuprofen (MOTRIN) 200 mg tablet Take 200-800 mg by mouth every 6 (six) hours as needed for mild pain, Historical Med      NON FORMULARY Medical marijuana drops prn, Historical Med           No discharge procedures on file  PDMP Review       Value Time User    PDMP Reviewed  Yes 9/20/2019  1:10 PM Wallace Antony MD           ED Provider  Attending physically available and evaluated Merissa Lopez I managed the patient along with the ED Attending      Electronically Signed by         Stella Reyna MD  11/26/22 0722

## 2022-11-23 NOTE — ED NOTES
Pt transported to [address] [place] [address], RN  11/23/22 1704 [address] [place] [address] [place]  11/23/22 1024

## 2023-01-16 PROBLEM — M54.12 CERVICAL RADICULOPATHY: Status: ACTIVE | Noted: 2023-01-16

## 2023-01-16 PROBLEM — N20.0 CALCULUS OF KIDNEY: Status: ACTIVE | Noted: 2023-01-16

## 2023-01-16 PROBLEM — N20.0 CALCULUS OF KIDNEY: Status: RESOLVED | Noted: 2023-01-16 | Resolved: 2023-01-16

## 2023-01-16 PROBLEM — I73.9 PVD (PERIPHERAL VASCULAR DISEASE) (HCC): Status: ACTIVE | Noted: 2020-07-22

## 2023-01-16 PROBLEM — M54.16 LUMBAR RADICULOPATHY: Status: ACTIVE | Noted: 2017-01-12

## 2023-01-16 PROBLEM — I83.90 VARICOSE VEINS: Status: ACTIVE | Noted: 2023-01-16

## 2023-01-16 RX ORDER — ZOLPIDEM TARTRATE 5 MG/1
TABLET ORAL
COMMUNITY

## 2023-01-16 RX ORDER — HYALURONATE SODIUM 20 MG/2 ML
4 SYRINGE (ML) INTRAARTICULAR
COMMUNITY

## 2023-01-17 ENCOUNTER — OFFICE VISIT (OUTPATIENT)
Dept: FAMILY MEDICINE CLINIC | Facility: CLINIC | Age: 78
End: 2023-01-17

## 2023-01-17 VITALS
RESPIRATION RATE: 16 BRPM | OXYGEN SATURATION: 98 % | HEIGHT: 64 IN | DIASTOLIC BLOOD PRESSURE: 76 MMHG | WEIGHT: 174 LBS | BODY MASS INDEX: 29.71 KG/M2 | TEMPERATURE: 97 F | HEART RATE: 62 BPM | SYSTOLIC BLOOD PRESSURE: 130 MMHG

## 2023-01-17 DIAGNOSIS — E89.2 STATUS POST PARATHYROIDECTOMY (HCC): ICD-10-CM

## 2023-01-17 DIAGNOSIS — I10 ACCELERATED HYPERTENSION: ICD-10-CM

## 2023-01-17 DIAGNOSIS — M81.8 OTHER OSTEOPOROSIS WITHOUT CURRENT PATHOLOGICAL FRACTURE: ICD-10-CM

## 2023-01-17 DIAGNOSIS — I73.9 PVD (PERIPHERAL VASCULAR DISEASE) (HCC): ICD-10-CM

## 2023-01-17 DIAGNOSIS — Z23 IMMUNIZATION DUE: Primary | ICD-10-CM

## 2023-01-17 DIAGNOSIS — Z12.31 BREAST CANCER SCREENING BY MAMMOGRAM: ICD-10-CM

## 2023-01-17 DIAGNOSIS — M15.9 GENERALIZED OSTEOARTHRITIS: ICD-10-CM

## 2023-01-17 DIAGNOSIS — M19.079 LOCALIZED, PRIMARY OSTEOARTHRITIS OF ANKLE OR FOOT, UNSPECIFIED LATERALITY: ICD-10-CM

## 2023-01-17 PROBLEM — F51.01 PRIMARY INSOMNIA: Status: ACTIVE | Noted: 2023-01-17

## 2023-01-17 PROBLEM — E83.52 HYPERCALCEMIA: Status: RESOLVED | Noted: 2019-04-10 | Resolved: 2023-01-17

## 2023-01-17 PROBLEM — L40.9 PSORIASIS: Status: ACTIVE | Noted: 2023-01-17

## 2023-01-17 PROBLEM — G43.011 INTRACTABLE MIGRAINE WITHOUT AURA AND WITH STATUS MIGRAINOSUS: Status: ACTIVE | Noted: 2023-01-17

## 2023-01-17 PROBLEM — E21.0 PRIMARY HYPERPARATHYROIDISM (HCC): Status: RESOLVED | Noted: 2019-05-13 | Resolved: 2023-01-17

## 2023-01-17 PROBLEM — B35.1 ONYCHOMYCOSIS OF TOENAIL: Status: ACTIVE | Noted: 2020-07-22

## 2023-01-17 PROBLEM — M79.675 PAIN IN TOE OF LEFT FOOT: Status: ACTIVE | Noted: 2022-07-07

## 2023-01-17 RX ORDER — BUTALBITAL, ACETAMINOPHEN AND CAFFEINE 50; 325; 40 MG/1; MG/1; MG/1
1 TABLET ORAL DAILY PRN
COMMUNITY
Start: 2022-12-24

## 2023-01-17 RX ORDER — NIRMATRELVIR AND RITONAVIR 300-100 MG
KIT ORAL
COMMUNITY
Start: 2022-10-13 | End: 2023-01-17

## 2023-01-17 RX ORDER — TRIAMCINOLONE ACETONIDE 1 MG/G
CREAM TOPICAL
COMMUNITY
Start: 2022-10-13

## 2023-01-17 RX ORDER — AMOXICILLIN AND CLAVULANATE POTASSIUM 875; 125 MG/1; MG/1
TABLET, FILM COATED ORAL
COMMUNITY
Start: 2022-11-02 | End: 2023-01-17

## 2023-01-17 NOTE — PROGRESS NOTES
Name: Debra Kessler      : 1945      MRN: [id number]  Encounter Provider: Jimbo Urena PA-C  Encounter Date: 2023   Encounter department: [address] [place] [address]     1  Breast cancer screening by mammogram  -     Mammo screening bilateral w 3d & cad; Future; Expected date: 2023    2  PVD (peripheral vascular disease) ([facility] [address] )    3  Status post parathyroidectomy ([facility] )    4  Accelerated hypertension    5  Other osteoporosis without current pathological fracture  -     DXA bone density spine hip and pelvis; Future; Expected date: 2023    6  Localized, primary osteoarthritis of ankle or foot, unspecified laterality    7  Generalized osteoarthritis          -Blood pressures under good control with Cozaar  She does not need a refill at this time  - I did discuss concerns about her taking Ambien 5 mg every night  I did recommend she start taking 2 5 mg every night to start weaning herself off the medication  I did recommend she start the Long brand melatonin 1 chewable nightly  - I did order a repeat DEXA scan, previous reviewed in care everywhere showed osteoporosis of her forearm but not of her spine which could be inconclusive because of osteoarthritis  - Stable peripheral vascular disease at this time  She denies any swelling of her lower extremities  - Pneumovax today  - Does take meloxicam daily 7 5 mg   - I did check her recent blood work and renal function is good  - I did recommend follow-up in 3 months to include her annual physical   She states that she will probably be coming back sooner for a preop clearance for dental procedure    M*Modal software was used to dictate this note  It may contain errors with dictating incorrect words/spelling  Please contact provider directly for any questions  Subjective      Patient presents today for new patient establishment    She states that her previous provider at [facility] does not have evening hours  She states her biggest concern is her blood pressure  She states that she needs upcoming dental surgery and she was told by the specialist that she could not have the surgery if her blood pressure was not under good control  She does take Cozaar 50 mg daily  sHe also takes Ambien 5 mg every night for quite a few years  She states that her previous primary care provider has been giving it to her  She did go to a sleep specialist but she was never able to proceed with the sleep study  She states that her sleep is not interrupted as long she takes the Ambien 5 mg nightly  She has tried melatonin tablets with no success  She has never seen a psychiatrist   Her last mammogram was May 2021  She does not feel as though she needs to have mammograms done anymore  Her last DEXA scan was in May 2021  She did show osteoporosis at that time but she was having issues with her parathyroid hormone  She states that she did have her parathyroid hormone removed  This was detected because of recurrent kidney stones  Review of Systems   Constitutional: Negative  Respiratory: Negative for cough and shortness of breath  Cardiovascular: Negative for chest pain and leg swelling     Psychiatric/Behavioral:        As stated in HPI       Current Outpatient Medications on File Prior to Visit   Medication Sig   • losartan (COZAAR) 50 mg tablet losartan 50 mg tablet   TAKE 1 TABLET BY MOUTH EVERY DAY   • Meloxicam 7 5 MG TBDP    • zolpidem (AMBIEN) 5 mg tablet zolpidem 5 mg tablet   TAKE 1 TABLET BY MOUTH EVERYDAY AT BEDTIME   • butalbital-acetaminophen-caffeine (FIORICET,ESGIC) -40 mg per tablet Take 1 tablet by mouth daily as needed (Patient not taking: Reported on 1/17/2023)   • cholecalciferol (VITAMIN D3) 1,000 units tablet Take 1,000 Units by mouth daily (Patient not taking: Reported on 1/17/2023)   • dexamethasone (DECADRON) 0 75 mg tablet  (Patient not taking: Reported on 1/17/2023) • fluticasone (FLONASE) 50 mcg/act nasal spray 1 spray into each nostril daily (Patient not taking: Reported on 1/17/2023)   • ibuprofen (MOTRIN) 200 mg tablet Take 200-800 mg by mouth every 6 (six) hours as needed for mild pain (Patient not taking: Reported on 1/17/2023)   • NON FORMULARY Medical marijuana drops prn (Patient not taking: Reported on 1/17/2023)   • Sodium Hyaluronate (Euflexxa) 20 MG/2ML SOSY 4 mL (Patient not taking: Reported on 1/17/2023)   • triamcinolone (KENALOG) 0 1 % cream APPLY ONCE A DAY TO AFFECTED AREAS ON ELBOWS AND KNEES WHEN NEEDED (Patient not taking: Reported on 1/17/2023)   • [DISCONTINUED] acetaminophen (TYLENOL) 325 mg tablet Take 650 mg by mouth every 6 (six) hours as needed for mild pain (Patient not taking: Reported on 1/17/2023)   • [DISCONTINUED] amoxicillin-clavulanate (AUGMENTIN) 875-125 mg per tablet  (Patient not taking: Reported on 1/17/2023)   • [DISCONTINUED] azelastine (ASTELIN) 0 1 % nasal spray 1 spray into each nostril 2 (two) times a day Use in each nostril as directed (Patient not taking: Reported on 1/17/2023)   • [DISCONTINUED] benzonatate (TESSALON PERLES) 100 mg capsule Take 1 capsule (100 mg total) by mouth every 8 (eight) hours (Patient not taking: Reported on 1/17/2023)   • [DISCONTINUED] Paxlovid, 300/100, tablet therapy pack TAKE BY ORAL ROUTE 2 TIMES EVERY DAY FOR 5 DAYS PER PACKAGE DIRECTIONS (Patient not taking: Reported on 1/17/2023)       Objective     /76 (BP Location: Left arm, Patient Position: Sitting, Cuff Size: Standard)   Pulse 62   Temp (!) 97 °F (36 1 °C) (Tympanic)   Resp 16   Ht 5' 4" (1 626 m)   Wt 78 9 kg (174 lb)   SpO2 98%   BMI 29 87 kg/m²     Physical Exam  Vitals reviewed  Constitutional:       General: She is not in acute distress  Appearance: Normal appearance  She is well-developed  She is not ill-appearing, toxic-appearing or diaphoretic  HENT:      Head: Normocephalic and atraumatic     Neck:      Thyroid: No thyromegaly  Cardiovascular:      Rate and Rhythm: Normal rate and regular rhythm  Heart sounds: Normal heart sounds  No murmur heard  No friction rub  No gallop  Pulmonary:      Effort: Pulmonary effort is normal  No respiratory distress  Breath sounds: Normal breath sounds  No wheezing, rhonchi or rales  Abdominal:      General: Bowel sounds are normal       Palpations: Abdomen is soft  There is no mass  Tenderness: There is no abdominal tenderness  Musculoskeletal:         General: No deformity  Cervical back: Neck supple  Right lower leg: No edema  Left lower leg: No edema  Comments: Varicosities of both lower extremities   Lymphadenopathy:      Cervical: No cervical adenopathy  Skin:     General: Skin is warm  Neurological:      General: No focal deficit present  Mental Status: She is alert  Psychiatric:         Mood and Affect: Mood normal          Behavior: Behavior normal          Thought Content:  Thought content normal          Judgment: Judgment normal        Linda Rodriguez PA-C

## 2023-01-25 ENCOUNTER — TELEPHONE (OUTPATIENT)
Dept: FAMILY MEDICINE CLINIC | Facility: CLINIC | Age: 78
End: 2023-01-25

## 2023-01-25 NOTE — TELEPHONE ENCOUNTER
I placed a form that needs to be completed by you on your desk regarding this patient  Please fill it out and return to the  to be faxed back  Thank you!

## 2023-02-07 ENCOUNTER — CONSULT (OUTPATIENT)
Dept: FAMILY MEDICINE CLINIC | Facility: CLINIC | Age: 78
End: 2023-02-07

## 2023-02-07 VITALS
TEMPERATURE: 97.6 F | BODY MASS INDEX: 30.11 KG/M2 | DIASTOLIC BLOOD PRESSURE: 68 MMHG | HEART RATE: 62 BPM | WEIGHT: 176.4 LBS | OXYGEN SATURATION: 98 % | SYSTOLIC BLOOD PRESSURE: 136 MMHG | HEIGHT: 64 IN | RESPIRATION RATE: 16 BRPM

## 2023-02-07 DIAGNOSIS — K08.9 DENTAL DISORDER: ICD-10-CM

## 2023-02-07 DIAGNOSIS — I73.9 PVD (PERIPHERAL VASCULAR DISEASE) (HCC): ICD-10-CM

## 2023-02-07 DIAGNOSIS — G43.011 INTRACTABLE MIGRAINE WITHOUT AURA AND WITH STATUS MIGRAINOSUS: ICD-10-CM

## 2023-02-07 DIAGNOSIS — M81.8 OTHER OSTEOPOROSIS WITHOUT CURRENT PATHOLOGICAL FRACTURE: ICD-10-CM

## 2023-02-07 DIAGNOSIS — Z01.818 PREOPERATIVE CLEARANCE: Primary | ICD-10-CM

## 2023-02-07 DIAGNOSIS — I10 ACCELERATED HYPERTENSION: ICD-10-CM

## 2023-02-07 NOTE — PROGRESS NOTES
Name: Ranjit Pardo      : 1945      MRN: [id number]  Encounter Provider: Janet Rodriguez PA-C  Encounter Date: 2023   Encounter department: [address] [place] [address]     1  Preoperative clearance    2  Dental disorder    3  Accelerated hypertension    4  Intractable migraine without aura and with status migrainosus    5  PVD (peripheral vascular disease) ([address] )    6  Other osteoporosis without current pathological fracture  -     DXA forearm wrist and heel; Future; Expected date: 2023          She is an acceptable risk for the proposed procedure  Her form has been completed and it will be faxed by [employer]  DEXA scan and mammograms have been ordered  I did put a different order in the system for her DEXA scan to include her forearm and heel since her previous DEXA scan done at [facility] in May 2021 was of her forearm which showed osteoporosis  Her lumbar spine was normal   I did encourage her to start taking calcium 1200 to 1500 mg daily but she states that she prefers to wait to see her DEXA scan results  I did recommend follow-up in 3 months to include her annual physical    M*Individual Digital software was used to dictate this note  It may contain errors with dictating incorrect words/spelling  Please contact provider directly for any questions  Subjective      Pre-Op Visit (Brief): The patient is being seen for a preoperative visit  The procedure is dental apico/root canal with Dr Luisa Lizarraga at [facility]  The indication for surgery is for dental pain      Surgical Risk Assessment:   Prior Anesthesia: Yes   Prior adverse reaction to general anesthesia:No      Pertinent Past Medical History  Neck osteoarthrosis: No  Seizure disorder:No  Asthma:No  Angina:No  Arrhythmia:No  CAD:No  CAD without prior MI:No  CAD without recent PCI:No  CHF:No  Chronic liver disease:No  Acute hepatitis::No  Coagulation delay:No  Primary hypercoagulable state: No  Secondary hypercoagulable state:No  pulmonary embolism:No  DVT:No  Use anticoagulants:No  Diabetes:No  Insulin use:No  Thyroid disease:No  TMJ osteoarthrosis:No  Wear dentures:No  CVA:No  COPD:No  DANIEL:No  Renal disease:No  Low serum albumin:No  Obesity:Yes     Exercise Capacity  Are you able to walk two flights of stairs::Yes   Are you able to walk four blocks without symptoms:Yes     Lifestyle Factors  Alcohol use:No  Tobacco use:No  Illegal drug use:No    Symptoms  Easy bleeding:No  Easy bruising:No  Frequent nosebleeds:No  Chest pain:No  Cough:No  Dyspnea:No  Edema:No  Palpitations:No  Wheezing:No    Pertinent Family History:  Any family members with the following problems:  Rx to anesthesia:No  Aneurysm:No  Bleeding problems:No  Sudden early deaths:No  Ischemic heart disease: father MI X4  Stroke:No    Review of Systems    Current Outpatient Medications on File Prior to Visit   Medication Sig   • losartan (COZAAR) 50 mg tablet losartan 50 mg tablet   TAKE 1 TABLET BY MOUTH EVERY DAY   • Meloxicam 7 5 MG TBDP    • butalbital-acetaminophen-caffeine (FIORICET,ESGIC) -40 mg per tablet Take 1 tablet by mouth daily as needed (Patient not taking: Reported on 1/17/2023)   • cholecalciferol (VITAMIN D3) 1,000 units tablet Take 1,000 Units by mouth daily (Patient not taking: Reported on 1/17/2023)   • dexamethasone (DECADRON) 0 75 mg tablet  (Patient not taking: Reported on 1/17/2023)   • fluticasone (FLONASE) 50 mcg/act nasal spray 1 spray into each nostril daily (Patient not taking: Reported on 1/17/2023)   • ibuprofen (MOTRIN) 200 mg tablet Take 200-800 mg by mouth every 6 (six) hours as needed for mild pain (Patient not taking: Reported on 1/17/2023)   • NON FORMULARY Medical marijuana drops prn (Patient not taking: Reported on 1/17/2023)   • Sodium Hyaluronate (Euflexxa) 20 MG/2ML SOSY 4 mL (Patient not taking: Reported on 1/17/2023)   • triamcinolone (KENALOG) 0 1 % cream APPLY ONCE A DAY TO AFFECTED AREAS ON ELBOWS AND KNEES WHEN NEEDED (Patient not taking: Reported on 1/17/2023)   • zolpidem (AMBIEN) 5 mg tablet zolpidem 5 mg tablet   TAKE 1 TABLET BY MOUTH EVERYDAY AT BEDTIME (Patient not taking: Reported on 2/7/2023)       Objective     /68 (BP Location: Left arm, Patient Position: Sitting, Cuff Size: Large)   Pulse 62   Temp 97 6 °F (36 4 °C) (Tympanic)   Resp 16   Ht 5' 4" (1 626 m)   Wt 80 kg (176 lb 6 4 oz)   SpO2 98%   BMI 30 28 kg/m²     Physical Exam  Vitals reviewed  Constitutional:       General: She is not in acute distress  Appearance: Normal appearance  She is well-developed  She is not ill-appearing, toxic-appearing or diaphoretic  HENT:      Head: Normocephalic and atraumatic  Neck:      Thyroid: No thyromegaly  Cardiovascular:      Rate and Rhythm: Normal rate and regular rhythm  Heart sounds: Normal heart sounds  No murmur heard  No friction rub  No gallop  Pulmonary:      Effort: Pulmonary effort is normal  No respiratory distress  Breath sounds: Normal breath sounds  No wheezing, rhonchi or rales  Abdominal:      General: Bowel sounds are normal       Palpations: Abdomen is soft  There is no mass  Tenderness: There is no abdominal tenderness  Musculoskeletal:         General: No deformity  Cervical back: Neck supple  Right lower leg: No edema  Left lower leg: No edema  Lymphadenopathy:      Cervical: No cervical adenopathy  Skin:     General: Skin is warm  Neurological:      General: No focal deficit present  Mental Status: She is alert  Psychiatric:         Mood and Affect: Mood normal          Behavior: Behavior normal          Thought Content:  Thought content normal          Judgment: Judgment normal        Linda Rodriguez PA-C

## 2023-05-01 ENCOUNTER — TELEPHONE (OUTPATIENT)
Dept: FAMILY MEDICINE CLINIC | Facility: CLINIC | Age: 78
End: 2023-05-01

## 2023-05-01 NOTE — TELEPHONE ENCOUNTER
Please call her to find out if she has a living wIll to bring to her next visit to be scanned into the system  If not we can give her 5 wishes at the time of her appointment on May 16    Thank you

## 2023-05-26 ENCOUNTER — OFFICE VISIT (OUTPATIENT)
Dept: URGENT CARE | Facility: MEDICAL CENTER | Age: 78
End: 2023-05-26

## 2023-05-26 VITALS
RESPIRATION RATE: 18 BRPM | HEART RATE: 82 BPM | TEMPERATURE: 97.3 F | WEIGHT: 185 LBS | HEIGHT: 64 IN | DIASTOLIC BLOOD PRESSURE: 68 MMHG | OXYGEN SATURATION: 97 % | BODY MASS INDEX: 31.58 KG/M2 | SYSTOLIC BLOOD PRESSURE: 145 MMHG

## 2023-05-26 DIAGNOSIS — R09.82 POST-NASAL DRIP: ICD-10-CM

## 2023-05-26 DIAGNOSIS — J01.00 ACUTE NON-RECURRENT MAXILLARY SINUSITIS: Primary | ICD-10-CM

## 2023-05-26 RX ORDER — DOXYCYCLINE 100 MG/1
100 TABLET ORAL 2 TIMES DAILY
Qty: 14 TABLET | Refills: 0 | Status: SHIPPED | OUTPATIENT
Start: 2023-05-26 | End: 2023-06-02

## 2023-05-26 RX ORDER — BENZONATATE 200 MG/1
200 CAPSULE ORAL 3 TIMES DAILY PRN
Qty: 20 CAPSULE | Refills: 0 | Status: SHIPPED | OUTPATIENT
Start: 2023-05-26

## 2023-05-26 NOTE — PROGRESS NOTES
[facility]        NAME: Dalila Young is a 69 y o  female  : 1945    MRN: [id number]  DATE: May 26, 2023  TIME: 7:14 PM    Assessment and Plan   Acute non-recurrent maxillary sinusitis [J01 00]  1  Acute non-recurrent maxillary sinusitis  benzonatate (TESSALON) 200 MG capsule    doxycycline (ADOXA) 100 MG tablet      2  Post-nasal drip  benzonatate (TESSALON) 200 MG capsule    doxycycline (ADOXA) 100 MG tablet            Patient Instructions   Start and complete course of antibiotics  Can take Tessalon for cough - try to time this so you take 1 before going to sleep at night  Flonase nasal spray will help to open up your sinuses and allow more mucus to drain  Follow up with PCP in 3-5 days  Proceed to ER if symptoms worsen  Chief Complaint     Chief Complaint   Patient presents with   • Cough     Pt states that she has a cough and nasal congestion for about a month  She states that she takes cough medicine at night and it helps but throughout the day it gets worse  Pt states that now when she blows her nose she has a lot of yellow phlegm  Pt denies any fevers  History of Present Illness       Patient reports cough and congestion for about 1 month  States she lives with 2 grandkids who bring her all the germs  States that she is unable to sleep due to the cough when she lays down at night  Has taken Nyquil which allowed her to sleep briefly  States she feels like the cough is deep from within her chest and it feels like when she had pneumonia last year  Review of Systems   Review of Systems   Constitutional: Positive for fatigue  Negative for activity change, appetite change and fever  HENT: Positive for congestion, sinus pressure and sore throat  Respiratory: Positive for cough  Negative for choking, shortness of breath and wheezing  Cardiovascular: Negative for chest pain, palpitations and leg swelling     Gastrointestinal: Negative for abdominal pain, diarrhea, nausea and vomiting  Neurological: Negative for dizziness, weakness, light-headedness and numbness           Current Medications       Current Outpatient Medications:   •  benzonatate (TESSALON) 200 MG capsule, Take 1 capsule (200 mg total) by mouth 3 (three) times a day as needed for cough, Disp: 20 capsule, Rfl: 0  •  cholecalciferol (VITAMIN D3) 1,000 units tablet, Take 1,000 Units by mouth daily, Disp: , Rfl:   •  doxycycline (ADOXA) 100 MG tablet, Take 1 tablet (100 mg total) by mouth 2 (two) times a day for 7 days, Disp: 14 tablet, Rfl: 0  •  losartan (COZAAR) 50 mg tablet, losartan 50 mg tablet  TAKE 1 TABLET BY MOUTH EVERY DAY, Disp: , Rfl:   •  Meloxicam 7 5 MG TBDP, , Disp: , Rfl:   •  other medication, see sig,, Medication/product name: secukinumab (COSENTYX PEN SUBQ), Disp: , Rfl:   •  zolpidem (AMBIEN) 5 mg tablet, , Disp: , Rfl:   •  butalbital-acetaminophen-caffeine (FIORICET,ESGIC) -40 mg per tablet, Take 1 tablet by mouth daily as needed (Patient not taking: Reported on 1/17/2023), Disp: , Rfl:   •  dexamethasone (DECADRON) 0 75 mg tablet, , Disp: , Rfl:   •  fluticasone (FLONASE) 50 mcg/act nasal spray, 1 spray into each nostril daily (Patient not taking: Reported on 1/17/2023), Disp: 16 g, Rfl: 0  •  ibuprofen (MOTRIN) 200 mg tablet, Take 200-800 mg by mouth every 6 (six) hours as needed for mild pain (Patient not taking: Reported on 5/26/2023), Disp: , Rfl:   •  NON FORMULARY, Medical marijuana drops prn (Patient not taking: Reported on 1/17/2023), Disp: , Rfl:   •  Sodium Hyaluronate (Euflexxa) 20 MG/2ML SOSY, 4 mL (Patient not taking: Reported on 1/17/2023), Disp: , Rfl:   •  triamcinolone (KENALOG) 0 1 % cream, APPLY ONCE A DAY TO AFFECTED AREAS ON ELBOWS AND KNEES WHEN NEEDED (Patient not taking: Reported on 1/17/2023), Disp: , Rfl:     Current Allergies     Allergies as of 05/26/2023 - Reviewed 05/26/2023   Allergen Reaction Noted   • Sulfa antibiotics Anaphylaxis 02/04/2017   • Cephalexin "Other (See Comments) 03/13/2007            The following portions of the patient's history were reviewed and updated as appropriate: allergies, current medications, past family history, past medical history, past social history, past surgical history and problem list      Past Medical History:   Diagnosis Date   • Anesthesia complication     aggitation   • Anxiety    • Arthritis    • Hearing loss     hearing  aids prn   • History of lumbar fusion    • Hypertension    • Kidney stones    • Peripheral neuropathy    • Psoriasis     right elbow   • SCC (squamous cell carcinoma)     chest   • Use of cane as ambulatory aid     or walker prn       Past Surgical History:   Procedure Laterality Date   • BACK SURGERY      lumbar fusion   • CARPAL TUNNEL RELEASE Bilateral    • CATARACT EXTRACTION Bilateral    • CHEST WALL BIOPSY N/A 10/9/2020    Procedure: CHEST SCC EXCISION; LOCAL FLAP;  Surgeon: Irma Samayoa MD;  Location: [facility];  Service: Plastics   • COLONOSCOPY     • FOOT SURGERY Left     fusion   • LAMINECTOMY     • [place] PARATHYROIDECTOMY/EXPLORATION PARATHYROIDS Right 10/3/2019    Procedure: MINIMALLY INVASIVE RIGHT Cynthia Ortiz PTH MONITORING;  Surgeon: Ana Maria Bullard MD;  Location: [facility];  Service: Surgical Oncology   • ULNAR NERVE TRANSPOSITION     • UTERINE FIBROID SURGERY         History reviewed  No pertinent family history  Medications have been verified  Objective   /68 (BP Location: Right arm)   Pulse 82   Temp (!) 97 3 °F (36 3 °C) (Tympanic)   Resp 18   Ht 5' 4\" (1 626 m)   Wt 83 9 kg (185 lb)   SpO2 97%   BMI 31 76 kg/m²   No LMP recorded  Patient is postmenopausal        Physical Exam     Physical Exam  Vitals and nursing note reviewed  Constitutional:       Appearance: Normal appearance  HENT:      Head: Normocephalic  Right Ear: Tympanic membrane normal       Left Ear: Tympanic membrane normal       Nose: Congestion present        Mouth/Throat: " Pharynx: No oropharyngeal exudate  Eyes:      Extraocular Movements: Extraocular movements intact  Conjunctiva/sclera: Conjunctivae normal       Pupils: Pupils are equal, round, and reactive to light  Cardiovascular:      Rate and Rhythm: Rhythm irregular  Pulses: Normal pulses  Heart sounds: Murmur heard  Pulmonary:      Effort: Pulmonary effort is normal  No respiratory distress  Breath sounds: Normal breath sounds  No stridor  No wheezing, rhonchi or rales  Chest:      Chest wall: No tenderness  Musculoskeletal:         General: Normal range of motion  Cervical back: Normal range of motion and neck supple  No tenderness  Lymphadenopathy:      Cervical: No cervical adenopathy  Skin:     General: Skin is warm and dry  Capillary Refill: Capillary refill takes less than 2 seconds  Neurological:      General: No focal deficit present  Mental Status: She is alert and oriented to person, place, and time  Mental status is at baseline

## 2023-05-26 NOTE — PATIENT INSTRUCTIONS
Start and complete course of antibiotics  Can take Tessalon for cough - try to time this so you take 1 before going to sleep at night  Flonase nasal spray will help to open up your sinuses and allow more mucus to drain  Follow up with PCP in 3-5 days  Proceed to ER if symptoms worsen

## 2023-12-21 ENCOUNTER — TELEPHONE (OUTPATIENT)
Dept: NEUROLOGY | Facility: CLINIC | Age: 78
End: 2023-12-21

## 2023-12-21 NOTE — TELEPHONE ENCOUNTER
1st attempt to reach patient from [email or web address] messaging for finding a doctor.  No answer.  Left detailed message on machine.

## 2024-02-21 PROBLEM — N39.0 UTI (URINARY TRACT INFECTION): Status: RESOLVED | Noted: 2019-03-30 | Resolved: 2024-02-21

## 2024-04-04 DIAGNOSIS — Z00.6 ENCOUNTER FOR EXAMINATION FOR NORMAL COMPARISON OR CONTROL IN CLINICAL RESEARCH PROGRAM: ICD-10-CM

## 2024-05-04 ENCOUNTER — APPOINTMENT (OUTPATIENT)
Dept: LAB | Facility: MEDICAL CENTER | Age: 79
End: 2024-05-04
Attending: PATHOLOGY

## 2024-05-04 DIAGNOSIS — Z00.6 ENCOUNTER FOR EXAMINATION FOR NORMAL COMPARISON OR CONTROL IN CLINICAL RESEARCH PROGRAM: ICD-10-CM

## 2024-05-04 PROCEDURE — 36415 COLL VENOUS BLD VENIPUNCTURE: CPT

## 2024-05-19 LAB
APOB+LDLR+PCSK9 GENE MUT ANL BLD/T: NOT DETECTED
BRCA1+BRCA2 DEL+DUP + FULL MUT ANL BLD/T: NOT DETECTED
MLH1+MSH2+MSH6+PMS2 GN DEL+DUP+FUL M: NOT DETECTED

## 2025-01-27 ENCOUNTER — APPOINTMENT (EMERGENCY)
Dept: CT IMAGING | Facility: HOSPITAL | Age: 80
End: 2025-01-27
Payer: COMMERCIAL

## 2025-01-27 ENCOUNTER — HOSPITAL ENCOUNTER (EMERGENCY)
Facility: HOSPITAL | Age: 80
Discharge: HOME | End: 2025-01-27
Attending: EMERGENCY MEDICINE | Admitting: EMERGENCY MEDICINE
Payer: COMMERCIAL

## 2025-01-27 VITALS
DIASTOLIC BLOOD PRESSURE: 101 MMHG | TEMPERATURE: 98.6 F | BODY MASS INDEX: 30.5 KG/M2 | HEART RATE: 77 BPM | RESPIRATION RATE: 17 BRPM | OXYGEN SATURATION: 96 % | WEIGHT: 177.69 LBS | SYSTOLIC BLOOD PRESSURE: 210 MMHG
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
## 2025-01-27 DIAGNOSIS — S20.221A CONTUSION OF RIGHT SIDE OF BACK, INITIAL ENCOUNTER: Primary | ICD-10-CM

## 2025-01-27 LAB
ANION GAP SERPL CALCULATED.3IONS-SCNC: 5 MMOL/L (ref 4–13)
BASOPHILS # BLD AUTO: 0.02 THOUSANDS/ΜL (ref 0–0.1)
BASOPHILS NFR BLD AUTO: 0 % (ref 0–1)
BUN SERPL-MCNC: 26 MG/DL (ref 5–25)
CALCIUM SERPL-MCNC: 8.8 MG/DL (ref 8.4–10.2)
CHLORIDE SERPL-SCNC: 110 MMOL/L (ref 96–108)
CO2 SERPL-SCNC: 27 MMOL/L (ref 21–32)
CREAT SERPL-MCNC: 0.67 MG/DL (ref 0.6–1.3)
EOSINOPHIL # BLD AUTO: 0.1 THOUSAND/ΜL (ref 0–0.61)
EOSINOPHIL NFR BLD AUTO: 2 % (ref 0–6)
ERYTHROCYTE [DISTWIDTH] IN BLOOD BY AUTOMATED COUNT: 12.4 % (ref 11.6–15.1)
GFR SERPL CREATININE-BSD FRML MDRD: 83 ML/MIN/1.73SQ M
GLUCOSE SERPL-MCNC: 120 MG/DL (ref 65–140)
HCT VFR BLD AUTO: 35.3 % (ref 34.8–46.1)
HGB BLD-MCNC: 12 G/DL (ref 11.5–15.4)
IMM GRANULOCYTES # BLD AUTO: 0.02 THOUSAND/UL (ref 0–0.2)
IMM GRANULOCYTES NFR BLD AUTO: 0 % (ref 0–2)
LYMPHOCYTES # BLD AUTO: 0.79 THOUSANDS/ΜL (ref 0.6–4.47)
LYMPHOCYTES NFR BLD AUTO: 14 % (ref 14–44)
MCH RBC QN AUTO: 30.1 PG (ref 26.8–34.3)
MCHC RBC AUTO-ENTMCNC: 34 G/DL (ref 31.4–37.4)
MCV RBC AUTO: 89 FL (ref 82–98)
MONOCYTES # BLD AUTO: 0.4 THOUSAND/ΜL (ref 0.17–1.22)
MONOCYTES NFR BLD AUTO: 7 % (ref 4–12)
NEUTROPHILS # BLD AUTO: 4.34 THOUSANDS/ΜL (ref 1.85–7.62)
NEUTS SEG NFR BLD AUTO: 77 % (ref 43–75)
NRBC BLD AUTO-RTO: 0 /100 WBCS
PLATELET # BLD AUTO: 219 THOUSANDS/UL (ref 149–390)
PMV BLD AUTO: 9.4 FL (ref 8.9–12.7)
POTASSIUM SERPL-SCNC: 4.1 MMOL/L (ref 3.5–5.3)
RBC # BLD AUTO: 3.99 MILLION/UL (ref 3.81–5.12)
SODIUM SERPL-SCNC: 142 MMOL/L (ref 135–147)
WBC # BLD AUTO: 5.67 THOUSAND/UL (ref 4.31–10.16)

## 2025-01-27 PROCEDURE — 74177 CT ABD & PELVIS W/CONTRAST: CPT

## 2025-01-27 PROCEDURE — 36415 COLL VENOUS BLD VENIPUNCTURE: CPT | Performed by: EMERGENCY MEDICINE

## 2025-01-27 PROCEDURE — 80048 BASIC METABOLIC PNL TOTAL CA: CPT | Performed by: EMERGENCY MEDICINE

## 2025-01-27 PROCEDURE — 99285 EMERGENCY DEPT VISIT HI MDM: CPT | Performed by: EMERGENCY MEDICINE

## 2025-01-27 PROCEDURE — 85025 COMPLETE CBC W/AUTO DIFF WBC: CPT | Performed by: EMERGENCY MEDICINE

## 2025-01-27 PROCEDURE — 96365 THER/PROPH/DIAG IV INF INIT: CPT

## 2025-01-27 PROCEDURE — 99284 EMERGENCY DEPT VISIT MOD MDM: CPT

## 2025-01-27 RX ORDER — NAPROXEN 500 MG/1
500 TABLET ORAL 2 TIMES DAILY WITH MEALS
Qty: 20 TABLET | Refills: 0 | Status: CANCELLED | OUTPATIENT
Start: 2025-01-27

## 2025-01-27 RX ORDER — LIDOCAINE 50 MG/G
1 PATCH TOPICAL DAILY
Qty: 10 PATCH | Refills: 0 | Status: SHIPPED | OUTPATIENT
Start: 2025-01-27 | End: 2025-01-27

## 2025-01-27 RX ORDER — TIZANIDINE 2 MG/1
2 TABLET ORAL EVERY 8 HOURS PRN
Qty: 20 TABLET | Refills: 0 | Status: SHIPPED | OUTPATIENT
Start: 2025-01-27 | End: 2025-01-27

## 2025-01-27 RX ORDER — LIDOCAINE 50 MG/G
1 PATCH TOPICAL ONCE
Status: DISCONTINUED | OUTPATIENT
Start: 2025-01-27 | End: 2025-01-27 | Stop reason: HOSPADM

## 2025-01-27 RX ORDER — TIZANIDINE 2 MG/1
2 TABLET ORAL EVERY 8 HOURS PRN
Qty: 20 TABLET | Refills: 0 | Status: SHIPPED | OUTPATIENT
Start: 2025-01-27

## 2025-01-27 RX ORDER — LIDOCAINE 50 MG/G
1 PATCH TOPICAL DAILY
Qty: 10 PATCH | Refills: 0 | Status: SHIPPED | OUTPATIENT
Start: 2025-01-27

## 2025-01-27 RX ORDER — ACETAMINOPHEN 10 MG/ML
1000 INJECTION, SOLUTION INTRAVENOUS ONCE
Status: COMPLETED | OUTPATIENT
Start: 2025-01-27 | End: 2025-01-27

## 2025-01-27 RX ADMIN — IOHEXOL 100 ML: 350 INJECTION, SOLUTION INTRAVENOUS at 19:08

## 2025-01-27 RX ADMIN — LIDOCAINE 1 PATCH: 700 PATCH TOPICAL at 18:12

## 2025-01-27 RX ADMIN — ACETAMINOPHEN 1000 MG: 10 INJECTION INTRAVENOUS at 18:12

## 2025-01-27 NOTE — ED PROVIDER NOTES
Time reflects when diagnosis was documented in both MDM as applicable and the Disposition within this note       Time User Action Codes Description Comment    1/27/2025  9:00 PM Darby Stanton Add [S20.221A] Contusion of right side of back, initial encounter     1/27/2025  9:00 PM Darby Stanton Add [W19.XXXA] Fall           ED Disposition       ED Disposition   Discharge    Condition   Stable    Date/Time   Mon Jan 27, 2025  9:00 PM    Comment   Cindi Lopez discharge to home/self care.                   Assessment & Plan       Medical Decision Making  S/p mechanical fall - Will CT A/P to r/o rib fx/PTX/kidney injury.    Amount and/or Complexity of Data Reviewed  Labs: ordered. Decision-making details documented in ED Course.  Radiology: ordered.    Risk  Prescription drug management.        ED Course as of 01/27/25 2122 Mon Jan 27, 2025 1825 Hemoglobin: 12.0  Normal   1922 Pt made aware of wait for CT read.  Pt reports pain is tolerable.   2104 Updated pt on CT result.       Medications   lidocaine (LIDODERM) 5 % patch 1 patch (1 patch Topical Medication Applied 1/27/25 1812)   acetaminophen (Ofirmev) injection 1,000 mg (0 mg Intravenous Stopped 1/27/25 1843)   iohexol (OMNIPAQUE) 350 MG/ML injection (MULTI-DOSE) 100 mL (100 mL Intravenous Given 1/27/25 1908)       ED Risk Strat Scores                          SBIRT 20yo+      Flowsheet Row Most Recent Value   Initial Alcohol Screen: US AUDIT-C     1. How often do you have a drink containing alcohol? 0 Filed at: 01/27/2025 1825   2. How many drinks containing alcohol do you have on a typical day you are drinking?  0 Filed at: 01/27/2025 1825   3b. FEMALE Any Age, or MALE 65+: How often do you have 4 or more drinks on one occassion? 0 Filed at: 01/27/2025 1825   Audit-C Score 0 Filed at: 01/27/2025 1825   BONIFACIO: How many times in the past year have you...    Used an illegal drug or used a prescription medication for non-medical reasons? Never Filed at:  01/27/2025 1825                            History of Present Illness       Chief Complaint   Patient presents with    Fall     Pt reports slipped on ice while getting out of car and landed on back. Pt c/o R rib and back pain. - thinners, - headstrike, -LOC.       Past Medical History:   Diagnosis Date    Anesthesia complication     aggitation    Anxiety     Arthritis     Hearing loss     hearing  aids prn    History of lumbar fusion     Hypertension     Kidney stones     Peripheral neuropathy     Psoriasis     right elbow    SCC (squamous cell carcinoma)     chest    Use of cane as ambulatory aid     or walker prn      Past Surgical History:   Procedure Laterality Date    BACK SURGERY      lumbar fusion    CARPAL TUNNEL RELEASE Bilateral     CATARACT EXTRACTION Bilateral     CHEST WALL BIOPSY N/A 10/9/2020    Procedure: CHEST SCC EXCISION; LOCAL FLAP;  Surgeon: Larry Thomas MD;  Location: [facility];  Service: Plastics    COLONOSCOPY      FOOT SURGERY Left     fusion    LAMINECTOMY      GA PARATHYROIDECTOMY/EXPLORATION PARATHYROIDS Right 10/3/2019    Procedure: MINIMALLY INVASIVE RIGHT PARATHYROIDECTOMY,  INRAOPERATIVE PTH MONITORING;  Surgeon: Ziggy Jaramillo MD;  Location:  MAIN OR;  Service: Surgical Oncology    ULNAR NERVE TRANSPOSITION      UTERINE FIBROID SURGERY        History reviewed. No pertinent family history.   Social History     Tobacco Use    Smoking status: Some Days     Types: Cigarettes    Smokeless tobacco: Never    Tobacco comments:     social smoker- smoked regularly yrs ago   Vaping Use    Vaping status: Never Used   Substance Use Topics    Alcohol use: Yes     Comment: wine    Drug use: No      E-Cigarette/Vaping    E-Cigarette Use Never User       E-Cigarette/Vaping Substances    Nicotine No     THC No     CBD No     Flavoring No     Other No     Unknown No       I have reviewed and agree with the history as documented.     80 y.o. F p/w back pain s/p fall x 2-3 hours ago. Slipped on ice,  fell backward, onto right back.  Did not strike head or have LOC. Having right sided back pain.  Hurts to breathe.  Denies anticoagulant/antiplatelet use.      History provided by:  Patient   used: No    Fall  Associated symptoms: back pain    Associated symptoms: no abdominal pain, no chest pain, no headaches, no nausea and no vomiting        Review of Systems   Eyes:  Negative for photophobia and visual disturbance.   Respiratory:  Positive for shortness of breath (Pain worse with breathing).    Cardiovascular:  Negative for chest pain.   Gastrointestinal:  Negative for abdominal pain, nausea and vomiting.   Musculoskeletal:  Positive for back pain.   Neurological:  Negative for weakness, numbness and headaches.           Objective       ED Triage Vitals [01/27/25 1648]   Temperature Pulse Blood Pressure Respirations SpO2 Patient Position - Orthostatic VS   98.6 °F (37 °C) 62 (!) 200/79 16 98 % Sitting      Temp Source Heart Rate Source BP Location FiO2 (%) Pain Score    Oral Monitor Right arm -- 8      Vitals      Date and Time Temp Pulse SpO2 Resp BP Pain Score FACES Pain Rating User   01/27/25 1922 -- 77 96 % 17  210/101 No Pain -- AJ   01/27/25 1648 98.6 °F (37 °C) 62 98 % 16 200/79 8 -- CO            Physical Exam  Vitals and nursing note reviewed.   Constitutional:       General: She is not in acute distress.     Appearance: Normal appearance. She is well-developed. She is not ill-appearing, toxic-appearing or diaphoretic.   HENT:      Head: Normocephalic and atraumatic. No raccoon eyes, Dahl's sign, abrasion, contusion or laceration.      Right Ear: Tympanic membrane and external ear normal. No laceration or drainage. No hemotympanum.      Left Ear: Tympanic membrane and external ear normal. No laceration or drainage. No hemotympanum.      Nose: Nose normal.   Eyes:      General:         Right eye: No discharge.         Left eye: No discharge.      Extraocular Movements: Extraocular  movements intact.      Conjunctiva/sclera:      Right eye: No hemorrhage.     Left eye: No hemorrhage.     Pupils: Pupils are equal, round, and reactive to light.   Neck:      Vascular: No JVD.      Trachea: Trachea normal. No tracheal tenderness or tracheal deviation.   Cardiovascular:      Rate and Rhythm: Normal rate and regular rhythm.      Heart sounds: Normal heart sounds. No murmur heard.     No friction rub.   Pulmonary:      Effort: Pulmonary effort is normal. No accessory muscle usage, respiratory distress or retractions.      Breath sounds: Normal breath sounds. No stridor. No decreased breath sounds, wheezing, rhonchi or rales.   Chest:      Chest wall: No tenderness.   Abdominal:      General: There is no distension.      Palpations: Abdomen is soft. Abdomen is not rigid. There is no mass.      Tenderness: There is no abdominal tenderness. There is no guarding or rebound.   Musculoskeletal:         General: No deformity. Normal range of motion.      Cervical back: Full passive range of motion without pain and normal range of motion. No bony tenderness. No spinous process tenderness or muscular tenderness. Normal range of motion.      Thoracic back: Tenderness (Right side) present. No bony tenderness.      Lumbar back: No bony tenderness.   Skin:     General: Skin is warm and dry.      Coloration: Skin is not pale.      Findings: Bruising (Right thoracic ribs) present. No abrasion, ecchymosis or laceration.          Neurological:      Mental Status: She is alert.      GCS: GCS eye subscore is 4. GCS verbal subscore is 5. GCS motor subscore is 6.      Cranial Nerves: No cranial nerve deficit.      Sensory: No sensory deficit.      Motor: Motor function is intact.   Psychiatric:         Behavior: Behavior is cooperative.         Results Reviewed       Procedure Component Value Units Date/Time    Basic metabolic panel [[id number]]  (Abnormal) Collected: 01/27/25 1812    Lab Status: Final result Specimen:  Blood from Arm, Left Updated: 01/27/25 1849     Sodium 142 mmol/L      Potassium 4.1 mmol/L      Chloride 110 mmol/L      CO2 27 mmol/L      ANION GAP 5 mmol/L      BUN 26 mg/dL      Creatinine 0.67 mg/dL      Glucose 120 mg/dL      Calcium 8.8 mg/dL      eGFR 83 ml/min/1.73sq m     Narrative:      National Kidney Disease Foundation guidelines for Chronic Kidney Disease (CKD):     Stage 1 with normal or high GFR (GFR > 90 mL/min/1.73 square meters)    Stage 2 Mild CKD (GFR = 60-89 mL/min/1.73 square meters)    Stage 3A Moderate CKD (GFR = 45-59 mL/min/1.73 square meters)    Stage 3B Moderate CKD (GFR = 30-44 mL/min/1.73 square meters)    Stage 4 Severe CKD (GFR = 15-29 mL/min/1.73 square meters)    Stage 5 End Stage CKD (GFR <15 mL/min/1.73 square meters)  Note: GFR calculation is accurate only with a steady state creatinine    CBC and differential [[id number]]  (Abnormal) Collected: 01/27/25 1812    Lab Status: Final result Specimen: Blood from Arm, Left Updated: 01/27/25 1823     WBC 5.67 Thousand/uL      RBC 3.99 Million/uL      Hemoglobin 12.0 g/dL      Hematocrit 35.3 %      MCV 89 fL      MCH 30.1 pg      MCHC 34.0 g/dL      RDW 12.4 %      MPV 9.4 fL      Platelets 219 Thousands/uL      nRBC 0 /100 WBCs      Segmented % 77 %      Immature Grans % 0 %      Lymphocytes % 14 %      Monocytes % 7 %      Eosinophils Relative 2 %      Basophils Relative 0 %      Absolute Neutrophils 4.34 Thousands/µL      Absolute Immature Grans 0.02 Thousand/uL      Absolute Lymphocytes 0.79 Thousands/µL      Absolute Monocytes 0.40 Thousand/µL      Eosinophils Absolute 0.10 Thousand/µL      Basophils Absolute 0.02 Thousands/µL             CT abdomen pelvis with contrast   Final Interpretation by Alan Lu MD (01/27 2057)      No acute findings in the abdomen or pelvis.      Colonic diverticulosis without evidence of diverticulitis.         Workstation performed: [id number]             Procedures    ED Medication and  Procedure Management   Prior to Admission Medications   Prescriptions Last Dose Informant Patient Reported? Taking?   Meloxicam 7.5 MG TBDP   Yes No   NON FORMULARY   Yes No   Sig: Medical marijuana drops prn   Patient not taking: Reported on 2023   Sodium Hyaluronate (Euflexxa) 20 MG/2ML SOSY   Yes No   Si mL   Patient not taking: Reported on 2023   benzonatate (TESSALON) 200 MG capsule   No No   Sig: Take 1 capsule (200 mg total) by mouth 3 (three) times a day as needed for cough   butalbital-acetaminophen-caffeine (FIORICET,ESGIC) -40 mg per tablet   Yes No   Sig: Take 1 tablet by mouth daily as needed   Patient not taking: Reported on 2023   cholecalciferol (VITAMIN D3) 1,000 units tablet  Self Yes No   Sig: Take 1,000 Units by mouth daily   dexamethasone (DECADRON) 0.75 mg tablet   Yes No   Patient not taking: Reported on 2023   fluticasone (FLONASE) 50 mcg/act nasal spray   No No   Si spray into each nostril daily   Patient not taking: Reported on 2023   ibuprofen (MOTRIN) 200 mg tablet   Yes No   Sig: Take 200-800 mg by mouth every 6 (six) hours as needed for mild pain   Patient not taking: Reported on 2023   losartan (COZAAR) 50 mg tablet   Yes No   Sig: losartan 50 mg tablet   TAKE 1 TABLET BY MOUTH EVERY DAY   other medication, see sig,   Yes No   Sig: Medication/product name: secukinumab (COSENTYX PEN SUBQ)   triamcinolone (KENALOG) 0.1 % cream   Yes No   Sig: APPLY ONCE A DAY TO AFFECTED AREAS ON ELBOWS AND KNEES WHEN NEEDED   Patient not taking: Reported on 2023   zolpidem (AMBIEN) 5 mg tablet   Yes No      Facility-Administered Medications: None     Discharge Medication List as of 2025  9:01 PM        START taking these medications    Details   lidocaine (Lidoderm) 5 % Apply 1 patch topically over 12 hours daily Remove & Discard patch within 12 hours or as directed by MD, Starting Mon 2025, Normal      tiZANidine (ZANAFLEX) 2 mg tablet Take 1  tablet (2 mg total) by mouth every 8 (eight) hours as needed for muscle spasms, Starting Mon 1/27/2025, Normal           CONTINUE these medications which have NOT CHANGED    Details   benzonatate (TESSALON) 200 MG capsule Take 1 capsule (200 mg total) by mouth 3 (three) times a day as needed for cough, Starting Fri 5/26/2023, Normal      butalbital-acetaminophen-caffeine (FIORICET,ESGIC) -40 mg per tablet Take 1 tablet by mouth daily as needed, Starting Sat 12/24/2022, Historical Med      cholecalciferol (VITAMIN D3) 1,000 units tablet Take 1,000 Units by mouth daily, Historical Med      dexamethasone (DECADRON) 0.75 mg tablet Starting Wed 11/2/2022, Historical Med      fluticasone (FLONASE) 50 mcg/act nasal spray 1 spray into each nostril daily, Starting Wed 11/23/2022, Normal      ibuprofen (MOTRIN) 200 mg tablet Take 200-800 mg by mouth every 6 (six) hours as needed for mild pain, Historical Med      losartan (COZAAR) 50 mg tablet losartan 50 mg tablet   TAKE 1 TABLET BY MOUTH EVERY DAY, Historical Med      Meloxicam 7.5 MG TBDP Historical Med      NON FORMULARY Medical marijuana drops prn, Historical Med      other medication, see sig, Medication/product name: secukinumab (COSENTYX PEN SUBQ), Historical Med      Sodium Hyaluronate (Euflexxa) 20 MG/2ML SOSY 4 mL, Historical Med      triamcinolone (KENALOG) 0.1 % cream APPLY ONCE A DAY TO AFFECTED AREAS ON ELBOWS AND KNEES WHEN NEEDED, Historical Med      zolpidem (AMBIEN) 5 mg tablet Historical Med           No discharge procedures on file.  ED SEPSIS DOCUMENTATION   Time reflects when diagnosis was documented in both MDM as applicable and the Disposition within this note       Time User Action Codes Description Comment    1/27/2025  9:00 PM Darby Stanton [S20.221A] Contusion of right side of back, initial encounter     1/27/2025  9:00 PM Darby Stanton [W19.XXXA] Fall                  Darby Stanton DO  01/27/25 2122     Cryotherapy Text: The wound bed was treated with cryotherapy after the biopsy was performed.

## 2025-01-28 NOTE — ED NOTES
Pt reports she did not take her BP meds this morning and believes that's why her BP is elevated. Pt offers no complaints at this time.     Christen Luis RN  01/27/25 2118

## 2025-04-02 ENCOUNTER — APPOINTMENT (OUTPATIENT)
Dept: LAB | Facility: CLINIC | Age: 80
End: 2025-04-02
Payer: COMMERCIAL

## 2025-04-02 DIAGNOSIS — L29.89 OTHER PRURITUS: ICD-10-CM

## 2025-04-02 DIAGNOSIS — L40.0 PSORIASIS VULGARIS: ICD-10-CM

## 2025-04-02 DIAGNOSIS — Z79.899 OTHER LONG TERM (CURRENT) DRUG THERAPY: ICD-10-CM

## 2025-04-02 LAB
ALBUMIN SERPL BCG-MCNC: 3.8 G/DL (ref 3.5–5)
ALP SERPL-CCNC: 96 U/L (ref 34–104)
ALT SERPL W P-5'-P-CCNC: 16 U/L (ref 7–52)
ANION GAP SERPL CALCULATED.3IONS-SCNC: 4 MMOL/L (ref 4–13)
AST SERPL W P-5'-P-CCNC: 25 U/L (ref 13–39)
BASOPHILS # BLD AUTO: 0.03 THOUSANDS/ÂΜL (ref 0–0.1)
BASOPHILS NFR BLD AUTO: 1 % (ref 0–1)
BILIRUB DIRECT SERPL-MCNC: 0.09 MG/DL (ref 0–0.2)
BILIRUB SERPL-MCNC: 0.63 MG/DL (ref 0.2–1)
BUN SERPL-MCNC: 20 MG/DL (ref 5–25)
CALCIUM SERPL-MCNC: 8.9 MG/DL (ref 8.4–10.2)
CHLORIDE SERPL-SCNC: 107 MMOL/L (ref 96–108)
CHOLEST SERPL-MCNC: 203 MG/DL (ref ?–200)
CO2 SERPL-SCNC: 31 MMOL/L (ref 21–32)
CREAT SERPL-MCNC: 0.51 MG/DL (ref 0.6–1.3)
EOSINOPHIL # BLD AUTO: 0.31 THOUSAND/ÂΜL (ref 0–0.61)
EOSINOPHIL NFR BLD AUTO: 8 % (ref 0–6)
ERYTHROCYTE [DISTWIDTH] IN BLOOD BY AUTOMATED COUNT: 12.2 % (ref 11.6–15.1)
GFR SERPL CREATININE-BSD FRML MDRD: 91 ML/MIN/1.73SQ M
GLUCOSE P FAST SERPL-MCNC: 100 MG/DL (ref 65–99)
HBV CORE AB SER QL: NORMAL
HBV SURFACE AB SER-ACNC: <3 MIU/ML
HBV SURFACE AG SER QL: NORMAL
HCT VFR BLD AUTO: 36.5 % (ref 34.8–46.1)
HCV AB SER QL: NORMAL
HDLC SERPL-MCNC: 68 MG/DL
HGB BLD-MCNC: 12.1 G/DL (ref 11.5–15.4)
IMM GRANULOCYTES # BLD AUTO: 0.01 THOUSAND/UL (ref 0–0.2)
IMM GRANULOCYTES NFR BLD AUTO: 0 % (ref 0–2)
LDLC SERPL CALC-MCNC: 120 MG/DL (ref 0–100)
LDLC SERPL DIRECT ASSAY-MCNC: 123 MG/DL (ref 0–100)
LYMPHOCYTES # BLD AUTO: 0.9 THOUSANDS/ÂΜL (ref 0.6–4.47)
LYMPHOCYTES NFR BLD AUTO: 23 % (ref 14–44)
MCH RBC QN AUTO: 29.3 PG (ref 26.8–34.3)
MCHC RBC AUTO-ENTMCNC: 33.2 G/DL (ref 31.4–37.4)
MCV RBC AUTO: 88 FL (ref 82–98)
MONOCYTES # BLD AUTO: 0.39 THOUSAND/ÂΜL (ref 0.17–1.22)
MONOCYTES NFR BLD AUTO: 10 % (ref 4–12)
NEUTROPHILS # BLD AUTO: 2.23 THOUSANDS/ÂΜL (ref 1.85–7.62)
NEUTS SEG NFR BLD AUTO: 58 % (ref 43–75)
NONHDLC SERPL-MCNC: 135 MG/DL
NRBC BLD AUTO-RTO: 0 /100 WBCS
PLATELET # BLD AUTO: 233 THOUSANDS/UL (ref 149–390)
PMV BLD AUTO: 9.6 FL (ref 8.9–12.7)
POTASSIUM SERPL-SCNC: 4.2 MMOL/L (ref 3.5–5.3)
PROT SERPL-MCNC: 6.4 G/DL (ref 6.4–8.4)
RBC # BLD AUTO: 4.13 MILLION/UL (ref 3.81–5.12)
SODIUM SERPL-SCNC: 142 MMOL/L (ref 135–147)
TRIGL SERPL-MCNC: 75 MG/DL (ref ?–150)
WBC # BLD AUTO: 3.87 THOUSAND/UL (ref 4.31–10.16)

## 2025-04-02 PROCEDURE — 87340 HEPATITIS B SURFACE AG IA: CPT

## 2025-04-02 PROCEDURE — 86706 HEP B SURFACE ANTIBODY: CPT

## 2025-04-02 PROCEDURE — 80076 HEPATIC FUNCTION PANEL: CPT

## 2025-04-02 PROCEDURE — 36415 COLL VENOUS BLD VENIPUNCTURE: CPT

## 2025-04-02 PROCEDURE — 86803 HEPATITIS C AB TEST: CPT

## 2025-04-02 PROCEDURE — 80061 LIPID PANEL: CPT

## 2025-04-02 PROCEDURE — 86480 TB TEST CELL IMMUN MEASURE: CPT

## 2025-04-02 PROCEDURE — 83721 ASSAY OF BLOOD LIPOPROTEIN: CPT

## 2025-04-02 PROCEDURE — 80048 BASIC METABOLIC PNL TOTAL CA: CPT

## 2025-04-02 PROCEDURE — 85025 COMPLETE CBC W/AUTO DIFF WBC: CPT

## 2025-04-02 PROCEDURE — 86704 HEP B CORE ANTIBODY TOTAL: CPT

## 2025-04-03 LAB
GAMMA INTERFERON BACKGROUND BLD IA-ACNC: 0.07 IU/ML
M TB IFN-G BLD-IMP: NEGATIVE
M TB IFN-G CD4+ BCKGRND COR BLD-ACNC: 0.02 IU/ML
M TB IFN-G CD4+ BCKGRND COR BLD-ACNC: 0.03 IU/ML
MITOGEN IGNF BCKGRD COR BLD-ACNC: 9.93 IU/ML

## 2025-04-28 ENCOUNTER — OFFICE VISIT (OUTPATIENT)
Age: 80
End: 2025-04-28
Payer: COMMERCIAL

## 2025-04-28 ENCOUNTER — APPOINTMENT (OUTPATIENT)
Age: 80
End: 2025-04-28
Attending: ORTHOPAEDIC SURGERY
Payer: COMMERCIAL

## 2025-04-28 DIAGNOSIS — M17.12 PRIMARY OSTEOARTHRITIS OF LEFT KNEE: Primary | ICD-10-CM

## 2025-04-28 DIAGNOSIS — M25.562 LEFT KNEE PAIN, UNSPECIFIED CHRONICITY: ICD-10-CM

## 2025-04-28 DIAGNOSIS — Z01.89 ENCOUNTER FOR LOWER EXTREMITY COMPARISON IMAGING STUDY: ICD-10-CM

## 2025-04-28 PROCEDURE — 99204 OFFICE O/P NEW MOD 45 MIN: CPT | Performed by: ORTHOPAEDIC SURGERY

## 2025-04-28 PROCEDURE — 73564 X-RAY EXAM KNEE 4 OR MORE: CPT

## 2025-04-28 NOTE — PROGRESS NOTES
:  Assessment & Plan  Primary osteoarthritis of left knee    Discussed treatment options  Brace as needed  Home exercises provided     Trip to [facility] in September, hopes to get new gel injections prior to trip   To follow up in July and then order HA injections for the LEFT knee at that time         REASON FOR VISIT:  Chief Complaint   Patient presents with    Left Knee - Pain         HISTORY OF PRESENT ILLNESS:  LEFT knee pain  Pain for several years that comes and goes   Has severe bone on bone osteoarthritis   Pain comes and goes   Today patient is feeling much better   Medial knee pain   Some associated clicking     Hx of Eufelxxa injections, completed 12/5/24 - good relief   Been swimming for exercise   Takes Meloxicam for pain control     Patient walks with a walker, has neuropathy and balance concerns   Has hx of 2 prior spine surgeries         Past Medical History:   Diagnosis Date    Anesthesia complication     aggitation    Anxiety     Arthritis     Hearing loss     hearing  aids prn    History of lumbar fusion     Hypertension     Kidney stones     Peripheral neuropathy     Psoriasis     right elbow    SCC (squamous cell carcinoma)     chest    Use of cane as ambulatory aid     or walker prn        Past Surgical History:   Procedure Laterality Date    BACK SURGERY      lumbar fusion    CARPAL TUNNEL RELEASE Bilateral     CATARACT EXTRACTION Bilateral     CHEST WALL BIOPSY N/A 10/9/2020    Procedure: CHEST SCC EXCISION; LOCAL FLAP;  Surgeon: Larry Thomas MD;  Location: [facility];  Service: Plastics    COLONOSCOPY      FOOT SURGERY Left     fusion    LAMINECTOMY      UT PARATHYROIDECTOMY/EXPLORATION PARATHYROIDS Right 10/3/2019    Procedure: MINIMALLY INVASIVE RIGHT PARATHYROIDECTOMY,  INRAOPERATIVE PTH MONITORING;  Surgeon: Ziggy Jaramillo MD;  Location:  [facility] OR;  Service: Surgical Oncology    ULNAR NERVE TRANSPOSITION      UTERINE FIBROID SURGERY         Social History     Socioeconomic History     Marital status: /Civil Union     Spouse name: Not on file    Number of children: Not on file    Years of education: Not on file    Highest education level: Not on file   Occupational History    Occupation: advisor   part time  [employer]   Tobacco Use    Smoking status: Some Days     Types: Cigarettes    Smokeless tobacco: Never    Tobacco comments:     social smoker- smoked regularly yrs ago   Vaping Use    Vaping status: Never Used   Substance and Sexual Activity    Alcohol use: Yes     Comment: wine    Drug use: No    Sexual activity: Not on file   Other Topics Concern    Not on file   Social History Narrative    Not on file     Social Drivers of Health     Financial Resource Strain: Not on file   Food Insecurity: Not on file   Transportation Needs: Not on file   Physical Activity: Not on file   Stress: Not on file   Social Connections: Not on file   Intimate Partner Violence: Not on file   Housing Stability: Not on file       MEDICATIONS:    Current Outpatient Medications:     benzonatate (TESSALON) 200 MG capsule, Take 1 capsule (200 mg total) by mouth 3 (three) times a day as needed for cough, Disp: 20 capsule, Rfl: 0    butalbital-acetaminophen-caffeine (FIORICET,ESGIC) -40 mg per tablet, Take 1 tablet by mouth daily as needed (Patient not taking: Reported on 1/17/2023), Disp: , Rfl:     cholecalciferol (VITAMIN D3) 1,000 units tablet, Take 1,000 Units by mouth daily, Disp: , Rfl:     dexamethasone (DECADRON) 0.75 mg tablet, , Disp: , Rfl:     fluticasone (FLONASE) 50 mcg/act nasal spray, 1 spray into each nostril daily (Patient not taking: Reported on 1/17/2023), Disp: 16 g, Rfl: 0    ibuprofen (MOTRIN) 200 mg tablet, Take 200-800 mg by mouth every 6 (six) hours as needed for mild pain (Patient not taking: Reported on 5/26/2023), Disp: , Rfl:     lidocaine (Lidoderm) 5 %, Apply 1 patch topically over 12 hours daily Remove & Discard patch within 12 hours or as directed by MD, Disp: 10 patch, Rfl: 0     losartan (COZAAR) 50 mg tablet, losartan 50 mg tablet  TAKE 1 TABLET BY MOUTH EVERY DAY, Disp: , Rfl:     Meloxicam 7.5 MG TBDP, , Disp: , Rfl:     NON FORMULARY, Medical marijuana drops prn (Patient not taking: Reported on 1/17/2023), Disp: , Rfl:     other medication, see sig,, Medication/product name: secukinumab (COSENTYX PEN SUBQ), Disp: , Rfl:     Sodium Hyaluronate (Euflexxa) 20 MG/2ML SOSY, 4 mL (Patient not taking: Reported on 1/17/2023), Disp: , Rfl:     tiZANidine (ZANAFLEX) 2 mg tablet, Take 1 tablet (2 mg total) by mouth every 8 (eight) hours as needed for muscle spasms, Disp: 20 tablet, Rfl: 0    triamcinolone (KENALOG) 0.1 % cream, APPLY ONCE A DAY TO AFFECTED AREAS ON ELBOWS AND KNEES WHEN NEEDED (Patient not taking: Reported on 1/17/2023), Disp: , Rfl:     zolpidem (AMBIEN) 5 mg tablet, , Disp: , Rfl:     ALLERGIES:  Allergies   Allergen Reactions    Sulfa Antibiotics Anaphylaxis    Cephalexin Other (See Comments)     Other reaction(s): itchy-hives         MAJOR FINDINGS:  Cindi Lopez is pleasant, healthy appearing, and in no acute distress.     LEFT knee  Skin intact, ROM 0-0-110   Trace effusion  Normal patella tracking   No patella apprehension  Joint line tenderness: +medial, Jose test: deferred due arthritis   Anterior drawer: negative, Lachman: stable, Posterior drawer: negative   Stable to varus/valgus  Sensation intact sp/dp/t  Strength intact 5/5 dorsiflexion/plantarflexion/SLR   Extremity wwp  No calf pain      RIGHT knee  Skin intact, ROM 0-0-120   No effusion  Normal patella tracking   No patella apprehension  Joint line tenderness: none, Jose test: negative  Anterior drawer: negative, Lachman: stable, Posterior drawer: negative   Stable to varus/valgus  Sensation intact sp/dp/t  Strength intact 5/5 dorsiflexion/plantarflexion/SLR   Extremity wwp  No calf pain    IMAGING  I personally reviewed and interpreted the imaging studies  X-rays performed on the LEFT knee show  advanced tricompartmental arthritis     CC:  Brittnee Wan, DO Self, Referral

## 2025-06-30 ENCOUNTER — APPOINTMENT (OUTPATIENT)
Age: 80
End: 2025-06-30
Attending: ORTHOPAEDIC SURGERY
Payer: COMMERCIAL

## 2025-06-30 ENCOUNTER — OFFICE VISIT (OUTPATIENT)
Age: 80
End: 2025-06-30
Payer: COMMERCIAL

## 2025-06-30 DIAGNOSIS — M51.361 DEGENERATION OF INTERVERTEBRAL DISC OF LUMBAR REGION WITH LOWER EXTREMITY PAIN: Primary | ICD-10-CM

## 2025-06-30 DIAGNOSIS — M25.551 RIGHT HIP PAIN: ICD-10-CM

## 2025-06-30 DIAGNOSIS — Z98.1 HISTORY OF LUMBAR FUSION: ICD-10-CM

## 2025-06-30 PROCEDURE — 73502 X-RAY EXAM HIP UNI 2-3 VIEWS: CPT

## 2025-06-30 PROCEDURE — 99213 OFFICE O/P EST LOW 20 MIN: CPT | Performed by: ORTHOPAEDIC SURGERY

## 2025-06-30 NOTE — PROGRESS NOTES
:  Assessment & Plan  Degeneration of intervertebral disc of lumbar region with lower extremity pain  History of lumbar fusion      Orders:    XR hip/pelv 2-3 vws right if performed; Future    Ambulatory referral to Spine & Pain Management; Future    Ambulatory Referral to Physical Therapy; Future    Ambulatory Referral to [facility]; Future      Degenerative disc disease lumbar spine  History of lumbar spinal fusion and laminectomy 2015  Discussed with patient that the majority of her symptoms are due to significant degenerative disc disease to her lumbar spine.   Referral to my [facility] in [facility] provided.   Referral to [facility] provided.   Referral to Physical  Therapy provided for bilateral lower extremity strengthening and impaired balance.       REASON FOR VISIT:  Chief Complaint   Patient presents with    Right Hip - Pain         HISTORY OF PRESENT ILLNESS:  RIGHT hip pain for a week. Pain is localized to the posterior hip/glutes. Pain does not radiate, she denies groin pain. History of spinal fusion and laminectomy on 6/18/2015 at L4-S1. And L2-4 laminectomy in January 2017. Has had bilateral lower extremity neuropathy since. Her pain has improved from onset, it is a constant burn/ache to her gluteal. Aggravated with activity. She ambulates with a walker due to pain, weakness, and loss of balance.       Past Medical History[1]     Past Surgical History[2]    Social History     Socioeconomic History    Marital status: /Civil Union     Spouse name: Not on file    Number of children: Not on file    Years of education: Not on file    Highest education level: Not on file   Occupational History    Occupation: advisor   part time  [employer]   Tobacco Use    Smoking status: Some Days     Types: Cigarettes    Smokeless tobacco: Never    Tobacco comments:     social smoker- smoked regularly yrs ago   Vaping Use    Vaping status: Never Used   Substance and Sexual  Activity    Alcohol use: Yes     Comment: wine    Drug use: No    Sexual activity: Not on file   Other Topics Concern    Not on file   Social History Narrative    Not on file     Social Drivers of Health     Financial Resource Strain: Not on file   Food Insecurity: Not on file   Transportation Needs: Not on file   Physical Activity: Not on file   Stress: Not on file   Social Connections: Not on file   Intimate Partner Violence: Not on file   Housing Stability: Not on file         MEDICATIONS:  Current Medications[3]    ALLERGIES:  Allergies[4]      MAJOR FINDINGS:  Cindi Lopez is pleasant, healthy appearing, and in no acute distress. Appropriate mood, affect  Circulatory: extremities wwp    RIGHT hip     ER 30   IR 20  (-) SLR  (-) stinchfield   No pain with FADIR   Nontender over GT bursa   Sensation intact sp/dp/t  Strength intact 5/5 hip adductors/hip abductors/hip flexors   SLR intact  Extremity wwp      LEFT hip     ER 30   IR 20  No pain with FADIR   Nontender over GT bursa   Sensation intact sp/dp/t  Strength intact 5/5 hip adductors/hip abductors/hip flexors   SLR intact  Extremity wwp    IMAGING  I personally reviewed and interpreted the imaging studies  X-rays performed on the RIGHT hip show degenerative changes to the hip. Of note there is significant degenerative changes to the lumbar spine with levoscoliotic curvature and severe disc space narrowing.    CC:  Brittnee Wan, DO Self, Referral    Scribe Attestation      I,:  Carla Liu am acting as a scribe while in the presence of the attending physician.:       I,:  Christiano Finley MD personally performed the services described in this documentation    as scribed in my presence.:                    [1]   Past Medical History:  Diagnosis Date    Anesthesia complication     aggitation    Anxiety     Arthritis     Hearing loss     hearing  aids prn    History of lumbar fusion     Hypertension     Kidney stones     Peripheral  neuropathy     Psoriasis     right elbow    SCC (squamous cell carcinoma)     chest    Use of cane as ambulatory aid     or walker prn   [2]   Past Surgical History:  Procedure Laterality Date    BACK SURGERY      lumbar fusion    CARPAL TUNNEL RELEASE Bilateral     CATARACT EXTRACTION Bilateral     CHEST WALL BIOPSY N/A 10/9/2020    Procedure: CHEST SCC EXCISION; LOCAL FLAP;  Surgeon: Larry Thomas MD;  Location: [facility];  Service: Plastics    COLONOSCOPY      FOOT SURGERY Left     fusion    LAMINECTOMY      TN PARATHYROIDECTOMY/EXPLORATION PARATHYROIDS Right 10/3/2019    Procedure: MINIMALLY INVASIVE RIGHT PARATHYROIDECTOMY,  INRAOPERATIVE PTH MONITORING;  Surgeon: Ziggy Jaramillo MD;  [facility];  Service: Surgical Oncology    ULNAR NERVE TRANSPOSITION      UTERINE FIBROID SURGERY     [3]   Current Outpatient Medications:     benzonatate (TESSALON) 200 MG capsule, Take 1 capsule (200 mg total) by mouth 3 (three) times a day as needed for cough, Disp: 20 capsule, Rfl: 0    butalbital-acetaminophen-caffeine (FIORICET,ESGIC) -40 mg per tablet, Take 1 tablet by mouth daily as needed (Patient not taking: Reported on 1/17/2023), Disp: , Rfl:     cholecalciferol (VITAMIN D3) 1,000 units tablet, Take 1,000 Units by mouth daily, Disp: , Rfl:     dexamethasone (DECADRON) 0.75 mg tablet, , Disp: , Rfl:     fluticasone (FLONASE) 50 mcg/act nasal spray, 1 spray into each nostril daily (Patient not taking: Reported on 1/17/2023), Disp: 16 g, Rfl: 0    ibuprofen (MOTRIN) 200 mg tablet, Take 200-800 mg by mouth every 6 (six) hours as needed for mild pain (Patient not taking: Reported on 5/26/2023), Disp: , Rfl:     lidocaine (Lidoderm) 5 %, Apply 1 patch topically over 12 hours daily Remove & Discard patch within 12 hours or as directed by MD, Disp: 10 patch, Rfl: 0    losartan (COZAAR) 50 mg tablet, losartan 50 mg tablet  TAKE 1 TABLET BY MOUTH EVERY DAY, Disp: , Rfl:     Meloxicam 7.5 MG TBDP, , Disp: , Rfl:      NON FORMULARY, Medical marijuana drops prn (Patient not taking: Reported on 1/17/2023), Disp: , Rfl:     other medication, see sig,, Medication/product name: secukinumab (COSENTYX PEN SUBQ), Disp: , Rfl:     Sodium Hyaluronate (Euflexxa) 20 MG/2ML SOSY, 4 mL (Patient not taking: Reported on 1/17/2023), Disp: , Rfl:     tiZANidine (ZANAFLEX) 2 mg tablet, Take 1 tablet (2 mg total) by mouth every 8 (eight) hours as needed for muscle spasms, Disp: 20 tablet, Rfl: 0    triamcinolone (KENALOG) 0.1 % cream, APPLY ONCE A DAY TO AFFECTED AREAS ON ELBOWS AND KNEES WHEN NEEDED (Patient not taking: Reported on 1/17/2023), Disp: , Rfl:     zolpidem (AMBIEN) 5 mg tablet, , Disp: , Rfl:   [4]   Allergies  Allergen Reactions    Sulfa Antibiotics Anaphylaxis    Cephalexin Other (See Comments)     Other reaction(s): itchy-hives

## 2025-07-14 ENCOUNTER — OFFICE VISIT (OUTPATIENT)
Age: 80
End: 2025-07-14
Payer: COMMERCIAL

## 2025-07-14 ENCOUNTER — EVALUATION (OUTPATIENT)
Dept: PHYSICAL THERAPY | Facility: CLINIC | Age: 80
End: 2025-07-14
Payer: COMMERCIAL

## 2025-07-14 DIAGNOSIS — M51.361 DEGENERATION OF INTERVERTEBRAL DISC OF LUMBAR REGION WITH LOWER EXTREMITY PAIN: Primary | ICD-10-CM

## 2025-07-14 DIAGNOSIS — M51.361 DEGENERATION OF INTERVERTEBRAL DISC OF LUMBAR REGION WITH LOWER EXTREMITY PAIN: ICD-10-CM

## 2025-07-14 DIAGNOSIS — Z98.1 HISTORY OF LUMBAR FUSION: ICD-10-CM

## 2025-07-14 PROCEDURE — 99213 OFFICE O/P EST LOW 20 MIN: CPT | Performed by: ORTHOPAEDIC SURGERY

## 2025-07-14 PROCEDURE — 97162 PT EVAL MOD COMPLEX 30 MIN: CPT

## 2025-07-14 PROCEDURE — 97110 THERAPEUTIC EXERCISES: CPT

## 2025-07-14 NOTE — PROGRESS NOTES
PT Evaluation     Today's date: 2025  Patient name: Cindi Lopez  : 1945  MRN: [id number]  Referring provider: Christiano Finley MD  Dx:   Encounter Diagnosis     ICD-10-CM    1. Degeneration of intervertebral disc of lumbar region with lower extremity pain  M51.361 Ambulatory Referral to Physical Therapy      2. History of lumbar fusion  Z98.1 Ambulatory Referral to Physical Therapy          Start Time: 1400  Stop Time: 1445  Total time in clinic (min): 45 minutes    Assessment  Impairments: abnormal gait, abnormal muscle firing, abnormal or restricted ROM, abnormal movement, activity intolerance, impaired physical strength, lacks appropriate home exercise program, pain with function, poor posture  and poor body mechanics  Symptom irritability: moderate    Assessment details: Patient is a 79 y/o female presenting to PT with complaints of R sided low back and posterior hip pain beginning approximately 1 week ago. Upon clinical exam, patient presents with decreased lumbar ROM, decreased R hip ROM, decreased R posterior chain flexibility, and decreased motor control of her core and RLE musculature. These impairments limit the patient with walking, navigating stairs, standing up from a chair, and carrying weight on her R side. Exam findings and clinical signs and symptoms are suggestive of lumbar radiculopathy. Patient will benefit from physical therapy to address the above impairments and maximize functional mobility.     Understanding of Dx/Px/POC: good     Prognosis: good    Goals  STG - to be met by week 4  1. Patient will be independent with HEP throughout therapy to prepare for eventual transition to maintenance program.  2. Patient will improve subjective pain to <=6/10 at worst to improve QOL.  3. Patient will improve R piriformis flexibility to WFL to perform ADLs with less difficulty.    LTG - to be met by discharge   1.Patient will improve B hip strength to >=4/5 to improve functional  mobility.  2. Patient will improve FOTO score to age matched prediction to demonstrate functional improvements.  3. Patient will be able to complete her housework without difficulty to return to PLOF.  4. Patient will be able to navigate stairs without difficulty to return to PLOF.        Plan  Patient would benefit from: skilled physical therapy  Referral necessary: No  Planned modality interventions: cryotherapy and thermotherapy: hydrocollator packs    Planned therapy interventions: abdominal trunk stabilization, activity modification, balance/weight bearing training, body mechanics training, flexibility, functional ROM exercises, graded activity, graded exercise, home exercise program, manual therapy, neuromuscular re-education, patient/caregiver education, postural training, strengthening, stretching, therapeutic activities and therapeutic exercise    Frequency: 2x week  Duration in weeks: 12  Treatment plan discussed with: patient        Subjective Evaluation    History of Present Illness  Date of onset: 7/7/2025  Mechanism of injury: Patient reports R posterior hip pain beginning approximately 1 week ago. Patient reports no specific VASILE but notes the pain does go all the way down the outside of her leg sometimes. Reports hx of lumbar fusion and laminectomy in 2014 and 2017 which she has had BLE neuropathy from since. Notes she did have a fall in January which was not serious. Patient reports difficulty with walking, navigating stairs, standing up from a chair, and carrying weight on her R side due to her pain. Notes her pain increases with activity. Reports she uses a RW to walk due to balance issues. Reports tingling in her B feet due to her chronic neuropathy, and this has not changed since new hip pain. Reports heat and rest helps with her pain. Reports she was swimming every morning but stopped due to the pain. Reports she is seeing pain management Wednesday.     Patient Goals  Patient goals for therapy:  decreased pain, increased motion, improved balance, increased strength, independence with ADLs/IADLs and return to sport/leisure activities  Patient goal: avoid surgery, get rid of pain  Pain  Current pain ratin  At best pain rating: 3  At worst pain rating: 10  Location: R posterior hip and lateral leg    Social Support  Stairs in house: yes   Lives in: multiple-level home  Lives with: adult children    Employment status: working    Diagnostic Tests  X-ray: abnormal (RIGHT hip show degenerative changes to the hip. Of note there is significant degenerative changes to the lumbar spine with levoscoliotic curvature and severe disc space narrowing.)  Treatments  Current treatment: physical therapy        Objective     Concurrent Complaints  Negative for night pain, disturbed sleep and saddle (S4) numbness    Postural Observations  Seated posture: fair    Additional Postural Observation Details  Levoscoliosis    Gait analysis: antalgic with Rolator     Palpation     Additional Palpation Details  TTP R lumbar paraspinals, QL, glute med, piriformis with increased tightness  TTP B PSIS and glute min    Neurological Testing     Sensation     Lumbar   Left   Intact: light touch    Right   Intact: light touch    Additional Neurological Details  Tingling in B feet noted (hx neuropathy)    Active Range of Motion     Lumbar   Flexion:  WFL  Extension:  Restriction level: moderate  Left rotation:  Restriction level: minimal  Right rotation:  Restriction level: moderate    Additional Active Range of Motion Details  Lumbar repeated movements  FIS: NE, no pain  EIS: NE, no pain, felt good following     R hip PROM: (no pain)  Flexion: 100 deg  ER: 35 deg  IR: 2 deg    R hip AROM:   Flexion: 100 deg mild pain    Strength/Myotome Testing     Left Hip   Planes of Motion   Flexion: 3+  External rotation: 4-    Right Hip   Planes of Motion   Flexion: 3+  External rotation: 4-    Left Knee   Flexion: 4  Extension: 4    Right Knee    Flexion: 4  Extension: 4    Muscle Activation   Patient unable to activate left transverse abdominals and right transverse abdominals.     Additional Muscle Activation Details  B low back pain with bridge      Tests     Lumbar     Left   Negative passive SLR and slump test.     Right   Positive passive SLR.   Negative slump test.     Left Hip   Negative AGAPITO and FADIR.     Right Hip   Negative AGAPITO and FADIR.     Additional Tests Details  R piriformis tightness noted with comparable sign  B HS tightness noted      Flowsheet Rows      Flowsheet Row Most Recent Value   PT/OT G-Codes    Current Score 30   Projected Score 55               Precautions: History of spinal fusion and laminectomy on 6/18/2015 at L4-S1. And L2-4 laminectomy in January 2017, osteoporosis, fall risk, HTN      Manuals 7/14            R lumbar and glute STM             R piriformis stretch                                       Neuro Re-Ed             Glute set to bridge             TrA activation             Clam shells             SLR with TrA             Tandem balance             Hip add/abd isometrics             Standing rows             Ther Ex             NS             Piriformis stretch HEP            SKTC             LTR HEP            Standing lumbar ext             L SB with arm reach OH                                       Ther Activity             STS Nv?            Step up             Gait Training                                       Modalities             [facility]  edu

## 2025-07-14 NOTE — PROGRESS NOTES
:  Assessment & Plan  Degeneration of intervertebral disc of lumbar region with lower extremity pain           Degenerative disc disease lumbar spine  History of lumbar spinal fusion and laminectomy 2015  Discussed with patient that the majority of her symptoms are due to significant degenerative disc disease to her lumbar spine.   Follow up with Spine and Pain   Continue PT with [facility]  Referral to Physical  Therapy provided for bilateral lower extremity strengthening and impaired balance.      Bilateral knee arthritis:  LEFT knee reaction brace  Discussed bilateral injections prior to trip, will follow up in early September for this           REASON FOR VISIT:  Chief Complaint   Patient presents with    Left Knee - Follow-up         Interval Hx 7/14/25:   Has been more sore. Started PT.   Some pain in left knee      HISTORY OF PRESENT ILLNESS:  RIGHT hip pain for a week. Pain is localized to the posterior hip/glutes. Pain does not radiate, she denies groin pain. History of spinal fusion and laminectomy on 6/18/2015 at L4-S1. And L2-4 laminectomy in January 2017. Has had bilateral lower extremity neuropathy since. Her pain has improved from onset, it is a constant burn/ache to her gluteal. Aggravated with activity. She ambulates with a walker due to pain, weakness, and loss of balance.       Past Medical History[1]     Past Surgical History[2]    Social History     Socioeconomic History    Marital status: /Civil Union     Spouse name: Not on file    Number of children: Not on file    Years of education: Not on file    Highest education level: Not on file   Occupational History    Occupation: advisor   part time  [employer]   Tobacco Use    Smoking status: Some Days     Types: Cigarettes    Smokeless tobacco: Never    Tobacco comments:     social smoker- smoked regularly yrs ago   Vaping Use    Vaping status: Never Used   Substance and Sexual Activity    Alcohol use: Yes     Comment:  wine    Drug use: No    Sexual activity: Not on file   Other Topics Concern    Not on file   Social History Narrative    Not on file     Social Drivers of Health     Financial Resource Strain: Not on file   Food Insecurity: Not on file   Transportation Needs: Not on file   Physical Activity: Not on file   Stress: Not on file   Social Connections: Not on file   Intimate Partner Violence: Not on file   Housing Stability: Not on file         MEDICATIONS:  Current Medications[3]    ALLERGIES:  Allergies[4]      MAJOR FINDINGS:  Cindi Lopez is pleasant, healthy appearing, and in no acute distress. Appropriate mood, affect  Circulatory: extremities wwp    RIGHT hip     ER 30   IR 20  (-) SLR  (-) stinchfield   No pain with FADIR   Nontender over GT bursa   Sensation intact sp/dp/t  Strength intact 5/5 hip adductors/hip abductors/hip flexors   SLR intact  Extremity wwp        IMAGING  I personally reviewed and interpreted the imaging studies  X-rays performed on the RIGHT hip show degenerative changes to the hip. Of note there is significant degenerative changes to the lumbar spine with levoscoliotic curvature and severe disc space narrowing.    CC:  Brittnee Wan DO No ref. provider found    Scribe Attestation      I,:   am acting as a scribe while in the presence of the attending physician.:       I,:   personally performed the services described in this documentation    as scribed in my presence.:                    [1]   Past Medical History:  Diagnosis Date    Anesthesia complication     aggitation    Anxiety     Arthritis     Hearing loss     hearing  aids prn    History of lumbar fusion     Hypertension     Kidney stones     Peripheral neuropathy     Psoriasis     right elbow    SCC (squamous cell carcinoma)     chest    Use of cane as ambulatory aid     or walker prn   [2]   Past Surgical History:  Procedure Laterality Date    BACK SURGERY      lumbar fusion    CARPAL TUNNEL RELEASE Bilateral      CATARACT EXTRACTION Bilateral     CHEST WALL BIOPSY N/A 10/9/2020    Procedure: CHEST SCC EXCISION; LOCAL FLAP;  Surgeon: Larry Thomas MD;  Location[facility];  Service: Plastics    COLONOSCOPY      FOOT SURGERY Left     fusion    LAMINECTOMY      IN PARATHYROIDECTOMY/EXPLORATION PARATHYROIDS Right 10/3/2019    Procedure: MINIMALLY INVASIVE RIGHT PARATHYROIDECTOMY,  INRAOPERATIVE PTH MONITORING;  Surgeon: Ziggy Jaramillo MD;  [facility];  Service: Surgical Oncology    ULNAR NERVE TRANSPOSITION      UTERINE FIBROID SURGERY     [3]   Current Outpatient Medications:     benzonatate (TESSALON) 200 MG capsule, Take 1 capsule (200 mg total) by mouth 3 (three) times a day as needed for cough, Disp: 20 capsule, Rfl: 0    butalbital-acetaminophen-caffeine (FIORICET,ESGIC) -40 mg per tablet, Take 1 tablet by mouth daily as needed (Patient not taking: Reported on 1/17/2023), Disp: , Rfl:     cholecalciferol (VITAMIN D3) 1,000 units tablet, Take 1,000 Units by mouth daily, Disp: , Rfl:     dexamethasone (DECADRON) 0.75 mg tablet, , Disp: , Rfl:     fluticasone (FLONASE) 50 mcg/act nasal spray, 1 spray into each nostril daily (Patient not taking: Reported on 1/17/2023), Disp: 16 g, Rfl: 0    ibuprofen (MOTRIN) 200 mg tablet, Take 200-800 mg by mouth every 6 (six) hours as needed for mild pain (Patient not taking: Reported on 5/26/2023), Disp: , Rfl:     lidocaine (Lidoderm) 5 %, Apply 1 patch topically over 12 hours daily Remove & Discard patch within 12 hours or as directed by MD, Disp: 10 patch, Rfl: 0    losartan (COZAAR) 50 mg tablet, losartan 50 mg tablet  TAKE 1 TABLET BY MOUTH EVERY DAY, Disp: , Rfl:     Meloxicam 7.5 MG TBDP, , Disp: , Rfl:     NON FORMULARY, Medical marijuana drops prn (Patient not taking: Reported on 1/17/2023), Disp: , Rfl:     other medication, see sig,, Medication/product name: secukinumab (COSENTYX PEN SUBQ), Disp: , Rfl:     Sodium Hyaluronate (Euflexxa) 20 MG/2ML SOSY, 4 mL  (Patient not taking: Reported on 1/17/2023), Disp: , Rfl:     tiZANidine (ZANAFLEX) 2 mg tablet, Take 1 tablet (2 mg total) by mouth every 8 (eight) hours as needed for muscle spasms, Disp: 20 tablet, Rfl: 0    triamcinolone (KENALOG) 0.1 % cream, APPLY ONCE A DAY TO AFFECTED AREAS ON ELBOWS AND KNEES WHEN NEEDED (Patient not taking: Reported on 1/17/2023), Disp: , Rfl:     zolpidem (AMBIEN) 5 mg tablet, , Disp: , Rfl:   [4]   Allergies  Allergen Reactions    Sulfa Antibiotics Anaphylaxis    Cephalexin Other (See Comments)     Other reaction(s): itchy-hives

## 2025-07-16 ENCOUNTER — TELEPHONE (OUTPATIENT)
Age: 80
End: 2025-07-16

## 2025-07-16 ENCOUNTER — OFFICE VISIT (OUTPATIENT)
Age: 80
End: 2025-07-16
Payer: COMMERCIAL

## 2025-07-16 ENCOUNTER — APPOINTMENT (OUTPATIENT)
Age: 80
End: 2025-07-16
Attending: STUDENT IN AN ORGANIZED HEALTH CARE EDUCATION/TRAINING PROGRAM
Payer: COMMERCIAL

## 2025-07-16 VITALS — BODY MASS INDEX: 30.34 KG/M2 | HEIGHT: 64 IN | WEIGHT: 177.69 LBS

## 2025-07-16 DIAGNOSIS — Z98.1 HISTORY OF LUMBAR FUSION: ICD-10-CM

## 2025-07-16 DIAGNOSIS — M79.18 MYOFASCIAL PAIN SYNDROME: Primary | ICD-10-CM

## 2025-07-16 DIAGNOSIS — M51.361 DEGENERATION OF INTERVERTEBRAL DISC OF LUMBAR REGION WITH LOWER EXTREMITY PAIN: ICD-10-CM

## 2025-07-16 PROCEDURE — 72114 X-RAY EXAM L-S SPINE BENDING: CPT

## 2025-07-16 PROCEDURE — 99204 OFFICE O/P NEW MOD 45 MIN: CPT | Performed by: STUDENT IN AN ORGANIZED HEALTH CARE EDUCATION/TRAINING PROGRAM

## 2025-07-16 RX ORDER — AMLODIPINE BESYLATE 5 MG/1
1 TABLET ORAL DAILY
COMMUNITY
Start: 2025-06-15

## 2025-07-16 RX ORDER — BETAMETHASONE DIPROPIONATE 0.5 MG/G
CREAM TOPICAL
COMMUNITY
Start: 2025-06-24

## 2025-07-16 RX ORDER — TIRZEPATIDE 2.5 MG/.5ML
2.5 INJECTION, SOLUTION SUBCUTANEOUS WEEKLY
COMMUNITY
Start: 2025-07-08

## 2025-07-16 RX ORDER — RISANKIZUMAB-RZAA 150 MG/ML
INJECTION SUBCUTANEOUS
COMMUNITY
Start: 2025-05-19

## 2025-07-16 NOTE — TELEPHONE ENCOUNTER
Pt called in stating she still has not received any medication for her pain. Pt is requesting pain medication be sent to:     [facility]/pharmacy #[id number] - [place] - [phone] [facility]  [place] [id number]

## 2025-07-16 NOTE — PROGRESS NOTES
Assessment & Plan  History of lumbar fusion  Degeneration of intervertebral disc of lumbar region with lower extremity pain  Orders:    Ambulatory referral to Spine & Pain Management    MRI lumbar spine w wo contrast; Future    XR spine lumbar complete w bending minimum 6 views; Future    Plan:    Cindi Lopez is a 80 y.o. female PSHx L4-S1 spinal fusion and laminectomy (2015), removal of hardware and L2-4 laminectomy (January 2017) who presents for evaluation of low back/buttock pain radiating down RLE consistent with lumbar radiculitis. We discussed imaging, rehabilitation, optimization of medications and potential interventions. The following plan was formulated with the patient:    - Continue physical therapy  - Xray Lumbar Spine  - C/w Meloxicam 7.5mg BID PRN  - Tylenol 1g Q8H PRN For 3g/24hrs  - MRI Lumbar spine w/ and w/o contrast  - We discussed Gabapentin or Lyrica as possible neuropathic options; she declines at this time due to fear of weight gain  - Follow up after completion of imaging     Orders Placed This Encounter   Procedures    MRI lumbar spine w wo contrast     Reports some metal in her foot, has had MRI Cervical spine since then     Standing Status:   Future     Expected Date:   7/16/2025     Expiration Date:   7/16/2029     Scheduling Instructions:      There is no preparation for this test. Please leave your jewelry and valuables at home, wedding rings are the exception. All patients will be required to change into a hospital gown and pants.  Street clothes are not permitted in the MRI.  Magnetic nail polish must be removed prior to arrival for your test. Please bring your insurance cards, a form of photo ID and a list of your medications with you. Arrive 15 minutes prior to your appointment time in order to register. Please bring any prior CT or MRI studies of this area that were not performed at a [facility].            To schedule this appointment, please contact [facility]  "Scheduling at [phone] or [phone].            Prior to your appointment, please make sure you complete the MRI Screening Form when you e-Check in for your appointment. This will be available starting 7 days before your appointment in [place]. You may receive an e-mail with an activation code if you do not have a "[employer] account. If you do not have access to a device, we will complete your screening at your appointment.     Reason for Exam:   lumbar radic     For OP exams needed \"URGENT\", choose the appropriate timeframe below and call Central Scheduling at [phone]. No need to speak with a Radiologist.:   Not URGENT     What is the patient's sedation requirement?:   No Sedation     Does the patient need medication for Claustrophobia? If yes, order medication at this point.:   No     Does the patient wear a life vest, have an implanted cardiac device, a stimulation device, a sleep apnea stimulator, or a breast tissue expansion device?:   No     Release to patient through Materia:   Immediate    XR spine lumbar complete w bending minimum 6 views     Standing Status:   Future     Number of Occurrences:   1     Expected Date:   2025     Expiration Date:   2029     Scheduling Instructions:      Bring along any outside films relating to this procedure.             New Medications Ordered This Visit   Medications    amLODIPine (NORVASC) 5 mg tablet     Sig: Take 1 tablet by mouth in the morning    betamethasone, augmented, (DIPROLENE-AF) 0.05 % cream     Sig: PLEASE SEE ATTACHED FOR DETAILED DIRECTIONS    Skyrizi Pen 150 MG/ML SOAJ    Zepbound 2.5 MG/0.5ML auto-injector     Si.5 mg once a week for 4 weeks       My impressions and treatment recommendations were discussed in detail with the patient, who verbalized understanding and had no further questions.    History of Present Illness:    Referred by: Christiano Finley MD     Cindi Lopez is a 80 y.o. female  PSHx L4-S1 spinal fusion and " laminectomy (2015), removal of hardware and L2-4 laminectomy (January 2017), hx of idiopathic neuropathy who presents for initial evaluation of low back/buttock pain radiating down the right lower extremity. Pain started 1 month ago and is not associated with any inciting event or trauma. she describes the pain as cramping, pressure-like, dull/aching of moderate severity. Pain is rated 9/10 and interferes with daily activities. This pain is constant. Exacerbating factors include sitting, walking.     she has tried lidocaine, Voltaren, Celebrex, meloxicam.     Diagnostic studies include Xray lumbar spine with moderate scoliotic deformity, L4-S1 fusion with spacers and disc height loss I have personally reviewed pertinent films in PACS.      Pain is causing significant functional limitation resulting in diminished quality of life and impaired age appropriate ADLs.  Denies fever, chills, numbness/tingling, bowel/bladder dysfunction, motor weakness.      I have personally reviewed and/or updated the patient's past medical history, past surgical history, family history, social history, current medications, allergies, and vital signs today.     Review of Systems:    Review of Systems   Constitutional:  Negative for fever and unexpected weight change.   HENT:  Negative for trouble swallowing.    Eyes:  Negative for visual disturbance.   Respiratory:  Negative for shortness of breath and wheezing.    Cardiovascular:  Negative for chest pain and palpitations.   Gastrointestinal:  Negative for constipation, diarrhea and nausea.   Endocrine: Negative for cold intolerance and heat intolerance.   Genitourinary:  Negative for difficulty urinating and frequency.   Musculoskeletal:  Positive for arthralgias, back pain, gait problem and myalgias. Negative for joint swelling.   Skin:  Negative for rash.   Neurological:  Negative for dizziness, syncope, weakness and headaches.   Hematological:  Does not bruise/bleed easily.  "  Psychiatric/Behavioral:  Negative for dysphoric mood.        Past Medical History[1]    Past Surgical History[2]    Family History[3]    Social History     Occupational History    Occupation: advisor   part time  [employer]   Tobacco Use    Smoking status: Some Days     Types: Cigarettes    Smokeless tobacco: Never    Tobacco comments:     social smoker- smoked regularly yrs ago   Vaping Use    Vaping status: Never Used   Substance and Sexual Activity    Alcohol use: Yes     Comment: wine    Drug use: No    Sexual activity: Not on file       Current Medications[4]    Allergies[5]    Physical Exam:  Ht 5' 4\" (1.626 m)   Wt 80.6 kg (177 lb 11.1 oz)   BMI 30.50 kg/m²     Constitutional: normal, well developed, well nourished, alert, in no distress and non-toxic and no overt pain behavior.  HEENT: grossly intact  Neck: supple, symmetric, trachea midline and no masses   Pulmonary:even and unlabored  Cardiovascular:No edema or pitting edema present  Skin:Normal without rashes or lesions and well hydrated  Psychiatric:Mood and affect appropriate  Neurologic:Cranial Nerves II-XII grossly intact    Lumbar Musculoskeletal and Neurologic Exam:    Inspection: no atrophy of the LE musculature    Gait: non-antalgic gait    ROM: limited AROM of the lumbar spine in all planes; FROM at bilateral hips    Sensation: SILT bilateral L2-S1 dermatomes    Strength:       Right Left  Hip Flexion                     5           5  Knee Extension              5           5  Ankle Dorsiflexion          5           5  Great Toe Extension      5           5  Ankle Plantar Flexion     5           5      Reflexes: 2+ in bilateral knees and ankles.    Palpation: -TTP over bilateral greater trochanters and bilateral SI Joint. Diffusely tender over lumbar paraspinals    Provocative tests:     Seated Slump negative bilaterally    Lewis's: negative bilaterally     Sacral Compression/thrust test, AGAPITO, and Gaenslen's tests: pos b/l   FADIR: negative " bilaterally       Imaging  Xray Lumbar Spine 7/16/25  There are 5 non rib bearing lumbar vertebral bodies.    There is no evidence of acute fracture or destructive osseous lesion.   Moderate scoliotic deformity is noted. Straightening normal lumbar lordosis at the lower lumbar spine with grade 1 anterolisthesis L5-S1 and retrolisthesis L1-2. No dynamic instability.   L4-S1 fusion with intervertebral spacers with advanced disc height loss at the nonsurgical levels with exuberant multilevel vertebral body endplate spurring.   The pedicles appear intact.   Soft tissues are unremarkable.   IMPRESSION:   No acute osseous abnormality.     Postsurgical and degenerative changes as described.         [1]   Past Medical History:  Diagnosis Date    Anesthesia complication     aggitation    Anxiety     Arthritis     Hearing loss     hearing  aids prn    History of lumbar fusion     Hypertension     Kidney stones     Peripheral neuropathy     Psoriasis     right elbow    SCC (squamous cell carcinoma)     chest    Use of cane as ambulatory aid     or walker prn   [2]   Past Surgical History:  Procedure Laterality Date    BACK SURGERY      lumbar fusion    CARPAL TUNNEL RELEASE Bilateral     CATARACT EXTRACTION Bilateral     CHEST WALL BIOPSY N/A 10/9/2020    Procedure: CHEST SCC EXCISION; LOCAL FLAP;  Surgeon: Larry Thomas MD;  Location: [facility];  Service: Plastics    COLONOSCOPY      FOOT SURGERY Left     fusion    LAMINECTOMY      CA PARATHYROIDECTOMY/EXPLORATION PARATHYROIDS Right 10/3/2019    Procedure: MINIMALLY INVASIVE RIGHT PARATHYROIDECTOMY,  INRAOPERATIVE PTH MONITORING;  Surgeon: Ziggy Jaramillo MD;  Location:  MAIN OR;  Service: Surgical Oncology    ULNAR NERVE TRANSPOSITION      UTERINE FIBROID SURGERY     [3] No family history on file.  [4]   Current Outpatient Medications:     amLODIPine (NORVASC) 5 mg tablet, Take 1 tablet by mouth in the morning, Disp: , Rfl:     benzonatate (TESSALON) 200 MG capsule, Take 1  capsule (200 mg total) by mouth 3 (three) times a day as needed for cough, Disp: 20 capsule, Rfl: 0    betamethasone, augmented, (DIPROLENE-AF) 0.05 % cream, PLEASE SEE ATTACHED FOR DETAILED DIRECTIONS, Disp: , Rfl:     cholecalciferol (VITAMIN D3) 1,000 units tablet, Take 1,000 Units by mouth in the morning., Disp: , Rfl:     lidocaine (Lidoderm) 5 %, Apply 1 patch topically over 12 hours daily Remove & Discard patch within 12 hours or as directed by MD, Disp: 10 patch, Rfl: 0    losartan (COZAAR) 50 mg tablet, , Disp: , Rfl:     Meloxicam 7.5 MG TBDP, , Disp: , Rfl:     other medication, see sig,, Medication/product name: secukinumab (COSENTYX PEN SUBQ), Disp: , Rfl:     Skyrizi Pen 150 MG/ML SOAJ, , Disp: , Rfl:     tiZANidine (ZANAFLEX) 2 mg tablet, Take 1 tablet (2 mg total) by mouth every 8 (eight) hours as needed for muscle spasms, Disp: 20 tablet, Rfl: 0    Zepbound 2.5 MG/0.5ML auto-injector, 2.5 mg once a week for 4 weeks, Disp: , Rfl:     zolpidem (AMBIEN) 5 mg tablet, , Disp: , Rfl:     butalbital-acetaminophen-caffeine (FIORICET,ESGIC) -40 mg per tablet, Take 1 tablet by mouth daily as needed (Patient not taking: Reported on 7/16/2025), Disp: , Rfl:     dexamethasone (DECADRON) 0.75 mg tablet, , Disp: , Rfl:     fluticasone (FLONASE) 50 mcg/act nasal spray, 1 spray into each nostril daily (Patient not taking: Reported on 7/16/2025), Disp: 16 g, Rfl: 0    ibuprofen (MOTRIN) 200 mg tablet, Take 200-800 mg by mouth every 6 (six) hours as needed for mild pain (Patient not taking: Reported on 7/16/2025), Disp: , Rfl:     NON FORMULARY, Medical marijuana drops prn (Patient not taking: Reported on 7/16/2025), Disp: , Rfl:     Sodium Hyaluronate (Euflexxa) 20 MG/2ML SOSY, 4 mL (Patient not taking: Reported on 7/16/2025), Disp: , Rfl:     triamcinolone (KENALOG) 0.1 % cream, APPLY ONCE A DAY TO AFFECTED AREAS ON ELBOWS AND KNEES WHEN NEEDED (Patient not taking: Reported on 7/16/2025), Disp: , Rfl:   [5]    Allergies  Allergen Reactions    Sulfa Antibiotics Anaphylaxis    Cephalexin Other (See Comments)     Other reaction(s): itchy-hives

## 2025-07-16 NOTE — TELEPHONE ENCOUNTER
Caller paulette    Doctor: Dr. Gonzalez    Reason for call: pt is calling with back pain. Pt was seen today. She is scheduled for the MRI 07/24. Pt is requesting a medication for her pain.   Please advise pt     Call back#: [phone]

## 2025-07-17 RX ORDER — CYCLOBENZAPRINE HCL 5 MG
5 TABLET ORAL 3 TIMES DAILY PRN
Qty: 90 TABLET | Refills: 0 | Status: SHIPPED | OUTPATIENT
Start: 2025-07-17

## 2025-07-17 NOTE — TELEPHONE ENCOUNTER
Caller: manish Puente    Doctor: Dr. Gonzalez    Reason for call: pt returning the nurse's call    Call back#: call transferred to the nurse

## 2025-07-17 NOTE — TELEPHONE ENCOUNTER
S/w pt and reviewed OVS notes from [facility] with NEIL from 7/16. Pt did ask why the doctor cannot prescribe something for her pain, that waiting 2 weeks to have MRI then another week for results and intervention is too long. Nurse did advise we do not jump into opioids as we need to see what is wrong first. Pt did want the nurse to document that she did not think it was right not to treat her pain, that she will not take tylenol as it will not help. Pt did inform nurse that she had to go, that despite her pain, she still has to work.

## 2025-07-17 NOTE — TELEPHONE ENCOUNTER
Attempted to reach pt, LMOM with CB# and OH.    Per [facility]VS notes from 7/16 NEIL wrote:  - Continue physical therapy  - Xray Lumbar Spine  - C/w Meloxicam 7.5mg BID PRN  - Tylenol 1g Q8H PRN For 3g/24hrs  - MRI Lumbar spine w/ and w/o contrast  - We discussed Gabapentin or Lyrica as possible neuropathic options; she declines at this time due to fear of weight gain  - Follow up after completion of imaging

## 2025-07-17 NOTE — TELEPHONE ENCOUNTER
Caller: Cindi     Doctor: Dr. Gonzalez    Reason for call: patient returning nurses phone call     Call back#: [phone]

## 2025-07-19 ENCOUNTER — HOSPITAL ENCOUNTER (OUTPATIENT)
Dept: MRI IMAGING | Facility: HOSPITAL | Age: 80
Discharge: HOME | End: 2025-07-19
Attending: STUDENT IN AN ORGANIZED HEALTH CARE EDUCATION/TRAINING PROGRAM
Payer: COMMERCIAL

## 2025-07-19 DIAGNOSIS — M51.361 DEGENERATION OF INTERVERTEBRAL DISC OF LUMBAR REGION WITH LOWER EXTREMITY PAIN: ICD-10-CM

## 2025-07-19 DIAGNOSIS — Z98.1 HISTORY OF LUMBAR FUSION: ICD-10-CM

## 2025-07-19 PROCEDURE — 72158 MRI LUMBAR SPINE W/O & W/DYE: CPT

## 2025-07-19 PROCEDURE — A9585 GADOBUTROL INJECTION: HCPCS | Performed by: STUDENT IN AN ORGANIZED HEALTH CARE EDUCATION/TRAINING PROGRAM

## 2025-07-19 RX ORDER — GADOBUTROL 604.72 MG/ML
8 INJECTION INTRAVENOUS
Status: COMPLETED | OUTPATIENT
Start: 2025-07-19 | End: 2025-07-19

## 2025-07-19 RX ADMIN — GADOBUTROL 8 ML: 604.72 INJECTION INTRAVENOUS at 17:54

## 2025-07-21 RX ORDER — PREGABALIN 75 MG/1
75 CAPSULE ORAL 2 TIMES DAILY
Qty: 60 CAPSULE | Refills: 0 | Status: SHIPPED | OUTPATIENT
Start: 2025-07-21 | End: 2025-08-20

## 2025-07-21 NOTE — TELEPHONE ENCOUNTER
Caller: Jayjay SANDOVAL    Doctor: Dr. Gonzalez    Reason for call: Pt would like the doctor to specify STAT so the MRI can be completed sooner     Call back#: [phone]

## 2025-07-21 NOTE — TELEPHONE ENCOUNTER
S/w pt. Pt did try muscle relaxer but states not helpful for pain. States she is crawling at times when she needs to go to bathroom due to pain down leg.  Pt is now agreeable to trying lyrica or gabapentin  Please advise

## 2025-07-22 ENCOUNTER — OFFICE VISIT (OUTPATIENT)
Dept: PHYSICAL THERAPY | Facility: CLINIC | Age: 80
End: 2025-07-22
Attending: ORTHOPAEDIC SURGERY
Payer: COMMERCIAL

## 2025-07-22 ENCOUNTER — TELEPHONE (OUTPATIENT)
Age: 80
End: 2025-07-22

## 2025-07-22 DIAGNOSIS — M51.361 DEGENERATION OF INTERVERTEBRAL DISC OF LUMBAR REGION WITH LOWER EXTREMITY PAIN: Primary | ICD-10-CM

## 2025-07-22 DIAGNOSIS — Z98.1 HISTORY OF LUMBAR FUSION: ICD-10-CM

## 2025-07-22 PROCEDURE — 97112 NEUROMUSCULAR REEDUCATION: CPT | Performed by: PHYSICAL THERAPIST

## 2025-07-22 PROCEDURE — 97110 THERAPEUTIC EXERCISES: CPT | Performed by: PHYSICAL THERAPIST

## 2025-07-22 NOTE — TELEPHONE ENCOUNTER
PA for pregabalin (LYRICA) 75 mg capsule SUBMITTED to [employer]    via    [x][facility]-KEY: [id number]  []Surescripts-Case ID #    []Availity-Auth ID #  NDC #    []Faxed to plan   []Other website    []Phone call Case ID #      [x]PA sent as URGENT    All office notes, labs and other pertaining documents and studies sent. Clinical questions answered. Awaiting determination from insurance company.     Turnaround time for your insurance to make a decision on your Prior Authorization can take 7-21 business days.

## 2025-07-22 NOTE — TELEPHONE ENCOUNTER
PA for  Pregabalin 75MG APPROVED     Date(s) approved 5/22/2025  Authorization Expiration Date: 7/21/2026    Case #    Patient advised by          [x]MyChart Message  []Phone call   [x]LMOM  []L/M to call office as no active Communication consent on file  []Unable to leave detailed message as VM not approved on Communication consent       Pharmacy advised by    []Fax  [x]Phone call  []Secure Chat    Specialty Pharmacy    []     Approval letter scanned into Media No

## 2025-07-22 NOTE — PROGRESS NOTES
"Daily Note     Today's date: 2025  Patient name: Cindi Lopez  : 1945  MRN: [id number]  Referring provider: Christiano Finley MD  Dx:   Encounter Diagnosis     ICD-10-CM    1. Degeneration of intervertebral disc of lumbar region with lower extremity pain  M51.361       2. History of lumbar fusion  Z98.1           Start Time: 1700  Stop Time: 1740  Total time in clinic (min): 40 minutes    Subjective: Doing okay. Pain radiating into R thigh at start of session.       Objective: See treatment diary below    Unable to tolerate prone     Assessment: Tolerated treatment well. Improved intensity of R thigh symptoms post repeated extensions. Minimal centralization, however improved mobility demonstrated by second set. No reproduction of pain with table exercises. Patient demonstrated fatigue post treatment, exhibited good technique with therapeutic exercises, and would benefit from continued PT      Plan: Continue per plan of care.      Precautions: History of spinal fusion and laminectomy on 2015 at L4-S1. And L2-4 laminectomy in 2017, osteoporosis, fall risk, HTN      Manuals            R lumbar and glute STM  PE   R glute med   supine           R piriformis stretch  PE                                     Neuro Re-Ed             Glute set to bridge  x10           TrA activation  5\"x10           Clam shells  R x10           SLR with TrA  X10 ea           Tandem balance             Hip add/abd isometrics             Standing rows             Ther Ex             NS             Piriformis stretch HEP X20 sec           SKTC             LTR HEP X15            Standing lumbar ext  2x10           L SB with arm reach OH                                       Ther Activity             STS Nv? x10           Step up             Gait Training                                       Modalities             [facility]                              "

## 2025-07-22 NOTE — TELEPHONE ENCOUNTER
PA for pregabalin 75mg  APPROVED     Date(s) approved  5/22/25-7/21/26    Case #     Patient advised by          [x]MyChart Message  []Phone call   [x]LMOM  []L/M to call office as no active Communication consent on file  []Unable to leave detailed message as VM not approved on Communication consent       Pharmacy advised by    []Fax  [x]Phone call  []Secure Chat    Specialty Pharmacy    []      Approval letter scanned into Media Yes

## 2025-07-24 ENCOUNTER — OFFICE VISIT (OUTPATIENT)
Dept: PHYSICAL THERAPY | Facility: CLINIC | Age: 80
End: 2025-07-24
Attending: ORTHOPAEDIC SURGERY
Payer: COMMERCIAL

## 2025-07-24 DIAGNOSIS — Z98.1 HISTORY OF LUMBAR FUSION: ICD-10-CM

## 2025-07-24 DIAGNOSIS — M51.361 DEGENERATION OF INTERVERTEBRAL DISC OF LUMBAR REGION WITH LOWER EXTREMITY PAIN: Primary | ICD-10-CM

## 2025-07-24 PROCEDURE — 97110 THERAPEUTIC EXERCISES: CPT | Performed by: PHYSICAL THERAPIST

## 2025-07-24 PROCEDURE — 97112 NEUROMUSCULAR REEDUCATION: CPT | Performed by: PHYSICAL THERAPIST

## 2025-07-24 NOTE — PROGRESS NOTES
"Daily Note     Today's date: 2025  Patient name: Cindi Lopez  : 1945  MRN: [id number]  Referring provider: Christiano Finley MD  Dx:   Encounter Diagnosis     ICD-10-CM    1. Degeneration of intervertebral disc of lumbar region with lower extremity pain  M51.361       2. History of lumbar fusion  Z98.1           Start Time: 1615  Stop Time: 1655  Total time in clinic (min): 40 minutes    Subjective: Patient doing well. Continued improvement in low back/R hip pain. Pain localized to R hip and not traveling down to anterior thigh. No muscle soreness reported after LV.       Objective: See treatment diary below      Assessment: Tolerated treatment well. Cueing required for proper core engagement for table exercise. Improved mobility and pain with repeated extensions. Trialed repeated extensions with fulcrum with no change after standing extensions. Patient demonstrated fatigue post treatment, exhibited good technique with therapeutic exercises, and would benefit from continued PT. Continue with spine stability, glute and hip strengthening.       Plan: Continue per plan of care.      Precautions: History of spinal fusion and laminectomy on 2015 at L4-S1. And L2-4 laminectomy in 2017, osteoporosis, fall risk, HTN      Manuals           R lumbar and glute STM  PE   R glute med   supine PE   R glute med   supine          R piriformis stretch  PE PE                                    Neuro Re-Ed             Glute set to bridge  x10 2x10          TrA activation  5\"x10           Clam shells  R x10 R x20          SLR with TrA  X10 ea X10 ea          Tandem balance             Hip add/abd isometrics             Standing rows             Ther Ex             NS             Piriformis stretch HEP X20 sec 2X20 sec          SKTC             LTR HEP X15  x20          Standing lumbar ext  2x10 1x10 standing  1x10 fulcrum          L SB with arm reach OH                                       Ther " Activity             STS Nv? x10 x10          Step up             Gait Training                                       Modalities             [facility]  edu

## 2025-07-25 ENCOUNTER — OFFICE VISIT (OUTPATIENT)
Age: 80
End: 2025-07-25
Payer: COMMERCIAL

## 2025-07-25 VITALS — WEIGHT: 177.69 LBS | BODY MASS INDEX: 30.34 KG/M2 | HEIGHT: 64 IN

## 2025-07-25 DIAGNOSIS — M53.3 SACROILIAC JOINT DYSFUNCTION: ICD-10-CM

## 2025-07-25 DIAGNOSIS — G89.29 CHRONIC RIGHT-SIDED LOW BACK PAIN WITH RIGHT-SIDED SCIATICA: Primary | ICD-10-CM

## 2025-07-25 DIAGNOSIS — M54.41 CHRONIC RIGHT-SIDED LOW BACK PAIN WITH RIGHT-SIDED SCIATICA: Primary | ICD-10-CM

## 2025-07-25 PROCEDURE — 99214 OFFICE O/P EST MOD 30 MIN: CPT | Performed by: STUDENT IN AN ORGANIZED HEALTH CARE EDUCATION/TRAINING PROGRAM

## 2025-07-25 PROCEDURE — G2211 COMPLEX E/M VISIT ADD ON: HCPCS | Performed by: STUDENT IN AN ORGANIZED HEALTH CARE EDUCATION/TRAINING PROGRAM

## 2025-07-25 NOTE — PROGRESS NOTES
"Assessment & Plan        Plan:    Cindi Lopez is a 80 y.o. female who presents for follow up office visit in regards to ***. We discussed imaging, rehabilitation, optimization of medications and potential interventions. The following plan was formulated with the patient:    - ***  - ***  - Follow up in ***     No orders of the defined types were placed in this encounter.      No orders of the defined types were placed in this encounter.      My impressions and treatment recommendations were discussed in detail with the patient, who verbalized understanding and had no further questions.    History of Present Illness:  Cindi Lopez is a 80 y.o. female who presents for a follow up office visit in regards to ***. Since last visit *** .     she describes the pain as *** of *** severity. Pain is rated *** and interferes with daily activities.    Diagnostic studies include ***. {Imaging Review Statement:[id number]::\"I have personally reviewed pertinent films in PACS.\"}    Pain is causing significant functional limitation resulting in diminished quality of life and impaired age appropriate ADLs.  Denies fever, chills, numbness/tingling, bowel/bladder dysfunction, motor weakness.    Procedural Hx:  - ***    I have personally reviewed and/or updated the patient's past medical history, past surgical history, family history, social history, current medications, allergies, and vital signs today.     Review of Systems   Constitutional:  Negative for fever and unexpected weight change.   HENT:  Negative for trouble swallowing.    Eyes:  Negative for visual disturbance.   Respiratory:  Negative for shortness of breath and wheezing.    Cardiovascular:  Negative for chest pain and palpitations.   Gastrointestinal:  Negative for constipation, diarrhea and nausea.   Endocrine: Negative for cold intolerance and heat intolerance.   Genitourinary:  Negative for difficulty urinating and frequency.   Musculoskeletal:  Negative for " arthralgias, gait problem, joint swelling and myalgias.   Skin:  Negative for rash.   Neurological:  Negative for dizziness, syncope, weakness and headaches.   Hematological:  Does not bruise/bleed easily.   Psychiatric/Behavioral:  Negative for dysphoric mood.        Problem List[1]    Past Medical History[2]    Past Surgical History[3]    Family History[4]    Social History     Occupational History    Occupation: advisor   part time  [employer]   Tobacco Use    Smoking status: Some Days     Types: Cigarettes    Smokeless tobacco: Never    Tobacco comments:     social smoker- smoked regularly yrs ago   Vaping Use    Vaping status: Never Used   Substance and Sexual Activity    Alcohol use: Yes     Comment: wine    Drug use: No    Sexual activity: Not on file       Current Outpatient Medications on File Prior to Visit   Medication Sig    amLODIPine (NORVASC) 5 mg tablet Take 1 tablet by mouth in the morning    benzonatate (TESSALON) 200 MG capsule Take 1 capsule (200 mg total) by mouth 3 (three) times a day as needed for cough    betamethasone, augmented, (DIPROLENE-AF) 0.05 % cream PLEASE SEE ATTACHED FOR DETAILED DIRECTIONS    cholecalciferol (VITAMIN D3) 1,000 units tablet Take 1,000 Units by mouth in the morning.    cyclobenzaprine (FLEXERIL) 5 mg tablet Take 1 tablet (5 mg total) by mouth 3 (three) times a day as needed for muscle spasms    lidocaine (Lidoderm) 5 % Apply 1 patch topically over 12 hours daily Remove & Discard patch within 12 hours or as directed by MD    losartan (COZAAR) 50 mg tablet     Meloxicam 7.5 MG TBDP     other medication, see sig, Medication/product name: secukinumab (COSENTYX PEN SUBQ)    pregabalin (LYRICA) 75 mg capsule Take 1 capsule (75 mg total) by mouth 2 (two) times a day    Skyrizi Pen 150 MG/ML SOAJ     Zepbound 2.5 MG/0.5ML auto-injector 2.5 mg once a week for 4 weeks    zolpidem (AMBIEN) 5 mg tablet     butalbital-acetaminophen-caffeine (FIORICET,ESGIC) -40 mg per tablet  "Take 1 tablet by mouth daily as needed (Patient not taking: Reported on 1/17/2023)    dexamethasone (DECADRON) 0.75 mg tablet  (Patient not taking: Reported on 1/17/2023)    fluticasone (FLONASE) 50 mcg/act nasal spray 1 spray into each nostril daily (Patient not taking: Reported on 1/17/2023)    ibuprofen (MOTRIN) 200 mg tablet Take 200-800 mg by mouth every 6 (six) hours as needed for mild pain (Patient not taking: Reported on 5/26/2023)    NON FORMULARY Medical marijuana drops prn (Patient not taking: Reported on 1/17/2023)    Sodium Hyaluronate (Euflexxa) 20 MG/2ML SOSY 4 mL (Patient not taking: Reported on 1/17/2023)    triamcinolone (KENALOG) 0.1 % cream APPLY ONCE A DAY TO AFFECTED AREAS ON ELBOWS AND KNEES WHEN NEEDED (Patient not taking: Reported on 1/17/2023)     No current facility-administered medications on file prior to visit.       Allergies[5]    Physical Exam:    Ht 5' 4\" (1.626 m)   Wt 80.6 kg (177 lb 11.1 oz)   BMI 30.50 kg/m²     Constitutional: normal, well developed, well nourished, alert, in no distress and non-toxic and no overt pain behavior.  HEENT: grossly intact  Neck: supple, symmetric, trachea midline and no masses   Pulmonary:even and unlabored  Cardiovascular:No edema or pitting edema present  Skin:Normal without rashes or lesions and well hydrated  Psychiatric:Mood and affect appropriate  Neurologic:Cranial Nerves II-XII grossly intact    Imaging  {ImagingShortcuts:02590}       [1]   Patient Active Problem List  Diagnosis    Hydronephrosis    Accelerated hypertension    Calculus of ureter    Status post parathyroidectomy    Lumbar radiculopathy    Cervical radiculopathy    Lumbar spinal stenosis    Primary localized osteoarthrosis of ankle and foot    PVD (peripheral vascular disease) (HCC)    Sleep apnea    Spondylolisthesis, acquired    Status post lumbar spinal fusion    Varicose veins    Psoriasis    Intractable migraine without aura and with status migrainosus    Primary " insomnia    Onychomycosis of toenail    Pain in toe of left foot    Other osteoporosis without current pathological fracture    Generalized osteoarthritis    Dental disorder    Preoperative clearance    Left knee pain, unspecified chronicity    Primary osteoarthritis of left knee   [2]   Past Medical History:  Diagnosis Date    Anesthesia complication     aggitation    Anxiety     Arthritis     Hearing loss     hearing  aids prn    History of lumbar fusion     Hypertension     Kidney stones     Peripheral neuropathy     Psoriasis     right elbow    SCC (squamous cell carcinoma)     chest    Use of cane as ambulatory aid     or walker prn   [3]   Past Surgical History:  Procedure Laterality Date    BACK SURGERY      lumbar fusion    CARPAL TUNNEL RELEASE Bilateral     CATARACT EXTRACTION Bilateral     CHEST WALL BIOPSY N/A 10/9/2020    Procedure: CHEST SCC EXCISION; LOCAL FLAP;  Surgeon: Larry Thomas MD;  Location: [facility];  Service: Plastics    COLONOSCOPY      FOOT SURGERY Left     fusion    LAMINECTOMY      KS PARATHYROIDECTOMY/EXPLORATION PARATHYROIDS Right 10/3/2019    Procedure: MINIMALLY INVASIVE RIGHT PARATHYROIDECTOMY,  INRAOPERATIVE PTH MONITORING;  Surgeon: Ziggy Jaramillo MD;  Location:  MAIN OR;  Service: Surgical Oncology    ULNAR NERVE TRANSPOSITION      UTERINE FIBROID SURGERY     [4] No family history on file.  [5]   Allergies  Allergen Reactions    Sulfa Antibiotics Anaphylaxis    Cephalexin Other (See Comments)     Other reaction(s): itchy-hives

## 2025-07-25 NOTE — PROGRESS NOTES
Assessment & Plan  Chronic right-sided low back pain with right-sided sciatica  Sacroiliac joint dysfunction  Orders:    Case request operating room: Right SI Joint injection; Standing    Plan:    Cindi Lopez is a 80 y.o. female who presents for follow up office visit in regards to right low back/buttock pain on right worse with ambulating radiating to proximal thigh posteriorly. We discussed imaging, rehabilitation, optimization of medications and potential interventions. The following plan was formulated with the patient:    - Pending MRI Lumbar Spine w/ and w/o contrast report  - Schedule for right SI joint injection. Complete risks and benefits including bleeding, infection, tissue reaction, nerve injury and allergic reaction were discussed. The approach was demonstrated using models and literature was provided. Verbal and written consent was obtained.  - C/w Physical therapy  - Continue Lyrica 75mg BID  - C/w Tylenol 1g Q8H PRN for upto 3g/24hrs  - C/w Flexeril 5-10mg TID PRN. Advised to watch for sedation.   - C/w Meloxicam 7.5mg BID PRN   - Follow up for right SI joint     No orders of the defined types were placed in this encounter.      No orders of the defined types were placed in this encounter.      My impressions and treatment recommendations were discussed in detail with the patient, who verbalized understanding and had no further questions.    History of Present Illness:  Cindi Lopez is a 80 y.o. female PSHx L4-S1 spinal fusion and laminectomy (2015), removal of hardware and L2-4 laminectomy (January 2017) who presents for evaluation of low back/buttock pain radiating down RLE consistent with lumbar radiculitis . Since last visit underwent MRI lumbar spine w/ and w/o contrast. Started on Lyrica and in physical therapy.     Diagnostic studies include Xray lumbar spine with moderate scoliotic deformity, L4-S1 fusion with spacers and disc height loss I have personally reviewed pertinent films  in PACS.     Pain is causing significant functional limitation resulting in diminished quality of life and impaired age appropriate ADLs.  Denies fever, chills, numbness/tingling, bowel/bladder dysfunction, motor weakness.    I have personally reviewed and/or updated the patient's past medical history, past surgical history, family history, social history, current medications, allergies, and vital signs today.     Review of Systems   Constitutional:  Negative for chills and fever.   HENT:  Negative for ear pain and sore throat.    Eyes:  Negative for pain and visual disturbance.   Respiratory:  Negative for cough and shortness of breath.    Cardiovascular:  Negative for chest pain and palpitations.   Gastrointestinal:  Negative for abdominal pain and vomiting.   Genitourinary:  Negative for dysuria and hematuria.   Musculoskeletal:  Positive for arthralgias, back pain and myalgias.   Skin:  Negative for color change and rash.   Neurological:  Negative for seizures and syncope.   All other systems reviewed and are negative.      Problem List[1]    Past Medical History[2]    Past Surgical History[3]    Family History[4]    Social History     Occupational History    Occupation: advisor   part time  [employer]   Tobacco Use    Smoking status: Some Days     Types: Cigarettes    Smokeless tobacco: Never    Tobacco comments:     social smoker- smoked regularly yrs ago   Vaping Use    Vaping status: Never Used   Substance and Sexual Activity    Alcohol use: Yes     Comment: wine    Drug use: No    Sexual activity: Not on file       Current Outpatient Medications on File Prior to Visit   Medication Sig    amLODIPine (NORVASC) 5 mg tablet Take 1 tablet by mouth in the morning    benzonatate (TESSALON) 200 MG capsule Take 1 capsule (200 mg total) by mouth 3 (three) times a day as needed for cough    betamethasone, augmented, (DIPROLENE-AF) 0.05 % cream PLEASE SEE ATTACHED FOR DETAILED DIRECTIONS    cholecalciferol (VITAMIN D3) 1,000  "units tablet Take 1,000 Units by mouth in the morning.    cyclobenzaprine (FLEXERIL) 5 mg tablet Take 1 tablet (5 mg total) by mouth 3 (three) times a day as needed for muscle spasms    lidocaine (Lidoderm) 5 % Apply 1 patch topically over 12 hours daily Remove & Discard patch within 12 hours or as directed by MD    losartan (COZAAR) 50 mg tablet     Meloxicam 7.5 MG TBDP     other medication, see sig, Medication/product name: secukinumab (COSENTYX PEN SUBQ)    pregabalin (LYRICA) 75 mg capsule Take 1 capsule (75 mg total) by mouth 2 (two) times a day    Skyrizi Pen 150 MG/ML SOAJ     Zepbound 2.5 MG/0.5ML auto-injector 2.5 mg once a week for 4 weeks    zolpidem (AMBIEN) 5 mg tablet     butalbital-acetaminophen-caffeine (FIORICET,ESGIC) -40 mg per tablet Take 1 tablet by mouth daily as needed (Patient not taking: Reported on 1/17/2023)    dexamethasone (DECADRON) 0.75 mg tablet  (Patient not taking: Reported on 1/17/2023)    fluticasone (FLONASE) 50 mcg/act nasal spray 1 spray into each nostril daily (Patient not taking: Reported on 1/17/2023)    ibuprofen (MOTRIN) 200 mg tablet Take 200-800 mg by mouth every 6 (six) hours as needed for mild pain (Patient not taking: Reported on 5/26/2023)    NON FORMULARY Medical marijuana drops prn (Patient not taking: Reported on 1/17/2023)    Sodium Hyaluronate (Euflexxa) 20 MG/2ML SOSY 4 mL (Patient not taking: Reported on 1/17/2023)    triamcinolone (KENALOG) 0.1 % cream APPLY ONCE A DAY TO AFFECTED AREAS ON ELBOWS AND KNEES WHEN NEEDED (Patient not taking: Reported on 1/17/2023)     No current facility-administered medications on file prior to visit.       Allergies[5]    Physical Exam:    Ht 5' 4\" (1.626 m)   Wt 80.6 kg (177 lb 11.1 oz)   BMI 30.50 kg/m²     Constitutional: normal, well developed, well nourished, alert, in no distress and non-toxic and no overt pain behavior.  HEENT: grossly intact  Neck: supple, symmetric, trachea midline and no masses "   Pulmonary:even and unlabored  Cardiovascular:No edema or pitting edema present  Skin:Normal without rashes or lesions and well hydrated  Psychiatric:Mood and affect appropriate  Neurologic:Cranial Nerves II-XII grossly intact      Lumbar Musculoskeletal and Neurologic Exam:                         Inspection: no atrophy of the LE musculature                      Gait: non-antalgic gait                   ROM: limited AROM of the lumbar spine in all planes; FROM at bilateral hips                       Sensation: SILT bilateral L2-S1 dermatomes                      Strength:                                                                  Right     Left  Hip Flexion                     5           5  Knee Extension              5           5  Ankle Dorsiflexion          5           5  Great Toe Extension      5           5  Ankle Plantar Flexion     5           5                              Reflexes: 2+ in bilateral knees and ankles.                          Palpation: -TTP over bilateral greater trochanters and bilateral SI Joint. Diffusely tender over lumbar paraspinals                       Provocative tests:                                       Seated Slump negative bilaterally                Lewis's: negative bilaterally                        Sacral Compression/thrust test, AGAPITO, and Gaenslen's tests: pos b/l               FADIR: negative bilaterally                            Imaging  Xray Lumbar Spine 7/16/25  There are 5 non rib bearing lumbar vertebral bodies.    There is no evidence of acute fracture or destructive osseous lesion.   Moderate scoliotic deformity is noted. Straightening normal lumbar lordosis at the lower lumbar spine with grade 1 anterolisthesis L5-S1 and retrolisthesis L1-2. No dynamic instability.   L4-S1 fusion with intervertebral spacers with advanced disc height loss at the nonsurgical levels with exuberant multilevel vertebral body endplate spurring.   The pedicles appear intact.    Soft tissues are unremarkable.   IMPRESSION:   No acute osseous abnormality.     Postsurgical and degenerative changes as described.         [1]   Patient Active Problem List  Diagnosis    Hydronephrosis    Accelerated hypertension    Calculus of ureter    Status post parathyroidectomy    Lumbar radiculopathy    Cervical radiculopathy    Lumbar spinal stenosis    Primary localized osteoarthrosis of ankle and foot    PVD (peripheral vascular disease) (HCC)    Sleep apnea    Spondylolisthesis, acquired    Status post lumbar spinal fusion    Varicose veins    Psoriasis    Intractable migraine without aura and with status migrainosus    Primary insomnia    Onychomycosis of toenail    Pain in toe of left foot    Other osteoporosis without current pathological fracture    Generalized osteoarthritis    Dental disorder    Preoperative clearance    Left knee pain, unspecified chronicity    Primary osteoarthritis of left knee   [2]   Past Medical History:  Diagnosis Date    Anesthesia complication     aggitation    Anxiety     Arthritis     Hearing loss     hearing  aids prn    History of lumbar fusion     Hypertension     Kidney stones     Peripheral neuropathy     Psoriasis     right elbow    SCC (squamous cell carcinoma)     chest    Use of cane as ambulatory aid     or walker prn   [3]   Past Surgical History:  Procedure Laterality Date    BACK SURGERY      lumbar fusion    CARPAL TUNNEL RELEASE Bilateral     CATARACT EXTRACTION Bilateral     CHEST WALL BIOPSY N/A 10/9/2020    Procedure: CHEST SCC EXCISION; LOCAL FLAP;  Surgeon: Larry Thomas MD;  Location: [facility];  Service: Plastics    COLONOSCOPY      FOOT SURGERY Left     fusion    LAMINECTOMY      NM PARATHYROIDECTOMY/EXPLORATION PARATHYROIDS Right 10/3/2019    Procedure: MINIMALLY INVASIVE RIGHT PARATHYROIDECTOMY,  INRAOPERATIVE PTH MONITORING;  Surgeon: Ziggy Jaramillo MD;  Location: [facility];  Service: Surgical Oncology    ULNAR NERVE TRANSPOSITION      UTERINE  FIBROID SURGERY     [4] No family history on file.  [5]   Allergies  Allergen Reactions    Sulfa Antibiotics Anaphylaxis    Cephalexin Other (See Comments)     Other reaction(s): itchy-hives

## 2025-07-28 ENCOUNTER — OFFICE VISIT (OUTPATIENT)
Dept: PHYSICAL THERAPY | Facility: CLINIC | Age: 80
End: 2025-07-28
Attending: ORTHOPAEDIC SURGERY
Payer: COMMERCIAL

## 2025-07-28 DIAGNOSIS — M51.361 DEGENERATION OF INTERVERTEBRAL DISC OF LUMBAR REGION WITH LOWER EXTREMITY PAIN: Primary | ICD-10-CM

## 2025-07-28 DIAGNOSIS — Z98.1 HISTORY OF LUMBAR FUSION: ICD-10-CM

## 2025-07-28 PROCEDURE — 97140 MANUAL THERAPY 1/> REGIONS: CPT

## 2025-07-28 PROCEDURE — 97110 THERAPEUTIC EXERCISES: CPT

## 2025-07-28 PROCEDURE — 97112 NEUROMUSCULAR REEDUCATION: CPT

## 2025-07-31 ENCOUNTER — APPOINTMENT (OUTPATIENT)
Dept: PHYSICAL THERAPY | Facility: CLINIC | Age: 80
End: 2025-07-31
Attending: ORTHOPAEDIC SURGERY
Payer: COMMERCIAL

## 2025-08-05 ENCOUNTER — APPOINTMENT (OUTPATIENT)
Dept: RADIOLOGY | Facility: HOSPITAL | Age: 80
End: 2025-08-05
Payer: COMMERCIAL

## 2025-08-05 ENCOUNTER — HOSPITAL ENCOUNTER (OUTPATIENT)
Facility: HOSPITAL | Age: 80
Setting detail: OUTPATIENT SURGERY
Discharge: HOME | End: 2025-08-05
Attending: STUDENT IN AN ORGANIZED HEALTH CARE EDUCATION/TRAINING PROGRAM | Admitting: STUDENT IN AN ORGANIZED HEALTH CARE EDUCATION/TRAINING PROGRAM
Payer: COMMERCIAL

## 2025-08-05 VITALS
DIASTOLIC BLOOD PRESSURE: 70 MMHG | HEART RATE: 54 BPM | RESPIRATION RATE: 20 BRPM | TEMPERATURE: 97.2 F | OXYGEN SATURATION: 100 % | SYSTOLIC BLOOD PRESSURE: 155 MMHG

## 2025-08-05 PROCEDURE — 27096 INJECT SACROILIAC JOINT: CPT | Performed by: STUDENT IN AN ORGANIZED HEALTH CARE EDUCATION/TRAINING PROGRAM

## 2025-08-05 RX ORDER — METHYLPREDNISOLONE ACETATE 40 MG/ML
INJECTION, SUSPENSION INTRA-ARTICULAR; INTRALESIONAL; INTRAMUSCULAR; SOFT TISSUE AS NEEDED
Status: DISCONTINUED | OUTPATIENT
Start: 2025-08-05 | End: 2025-08-05 | Stop reason: HOSPADM

## 2025-08-05 RX ORDER — INDOMETHACIN 25 MG/1
CAPSULE ORAL AS NEEDED
Status: DISCONTINUED | OUTPATIENT
Start: 2025-08-05 | End: 2025-08-05 | Stop reason: HOSPADM

## 2025-08-05 RX ORDER — ROPIVACAINE HYDROCHLORIDE 2 MG/ML
INJECTION, SOLUTION EPIDURAL; INFILTRATION; PERINEURAL AS NEEDED
Status: DISCONTINUED | OUTPATIENT
Start: 2025-08-05 | End: 2025-08-05 | Stop reason: HOSPADM

## 2025-08-07 ENCOUNTER — OFFICE VISIT (OUTPATIENT)
Dept: PHYSICAL THERAPY | Facility: CLINIC | Age: 80
End: 2025-08-07
Attending: ORTHOPAEDIC SURGERY
Payer: COMMERCIAL

## 2025-08-07 DIAGNOSIS — M51.361 DEGENERATION OF INTERVERTEBRAL DISC OF LUMBAR REGION WITH LOWER EXTREMITY PAIN: Primary | ICD-10-CM

## 2025-08-07 DIAGNOSIS — Z98.1 HISTORY OF LUMBAR FUSION: ICD-10-CM

## 2025-08-07 PROCEDURE — 97112 NEUROMUSCULAR REEDUCATION: CPT

## 2025-08-07 PROCEDURE — 97110 THERAPEUTIC EXERCISES: CPT

## 2025-08-07 PROCEDURE — 97140 MANUAL THERAPY 1/> REGIONS: CPT

## 2025-08-12 ENCOUNTER — OFFICE VISIT (OUTPATIENT)
Dept: PHYSICAL THERAPY | Facility: CLINIC | Age: 80
End: 2025-08-12
Attending: ORTHOPAEDIC SURGERY
Payer: COMMERCIAL

## 2025-08-14 ENCOUNTER — OFFICE VISIT (OUTPATIENT)
Dept: PHYSICAL THERAPY | Facility: CLINIC | Age: 80
End: 2025-08-14
Attending: ORTHOPAEDIC SURGERY
Payer: COMMERCIAL

## 2025-08-19 ENCOUNTER — EVALUATION (OUTPATIENT)
Dept: PHYSICAL THERAPY | Facility: CLINIC | Age: 80
End: 2025-08-19
Attending: ORTHOPAEDIC SURGERY
Payer: COMMERCIAL

## 2025-08-19 ENCOUNTER — TELEPHONE (OUTPATIENT)
Dept: PAIN MEDICINE | Facility: CLINIC | Age: 80
End: 2025-08-19

## 2025-08-19 DIAGNOSIS — M51.361 DEGENERATION OF INTERVERTEBRAL DISC OF LUMBAR REGION WITH LOWER EXTREMITY PAIN: Primary | ICD-10-CM

## 2025-08-19 DIAGNOSIS — Z98.1 HISTORY OF LUMBAR FUSION: ICD-10-CM

## 2025-08-19 PROCEDURE — 97112 NEUROMUSCULAR REEDUCATION: CPT

## 2025-08-19 PROCEDURE — 97140 MANUAL THERAPY 1/> REGIONS: CPT

## 2025-08-19 PROCEDURE — 97110 THERAPEUTIC EXERCISES: CPT

## (undated) DEVICE — GLOVE INDICATOR PI UNDERGLOVE SZ 7.5 BLUE

## (undated) DEVICE — PENCIL ELECTROSURG E-Z CLEAN -0035H

## (undated) DEVICE — GLOVE SRG BIOGEL ECLIPSE 7

## (undated) DEVICE — Device

## (undated) DEVICE — GLOVE PI ULTRA TOUCH SZ.6.5

## (undated) DEVICE — SCD SEQUENTIAL COMPRESSION COMFORT SLEEVE MEDIUM KNEE LENGTH: Brand: KENDALL SCD

## (undated) DEVICE — INTENDED FOR TISSUE SEPARATION, AND OTHER PROCEDURES THAT REQUIRE A SHARP SURGICAL BLADE TO PUNCTURE OR CUT.: Brand: BARD-PARKER SAFETY BLADES SIZE 15, STERILE

## (undated) DEVICE — PACK UNIVERSAL NECK

## (undated) DEVICE — GLOVE PI ULTRA TOUCH SZ.7.0

## (undated) DEVICE — SUT VICRYL 4-0 PS-2 27 IN J426H

## (undated) DEVICE — DRAPE SURGIKIT SADDLE BAG

## (undated) DEVICE — MINOR PROCEDURE DRAPE: Brand: CONVERTORS

## (undated) DEVICE — 3000CC GUARDIAN II: Brand: GUARDIAN

## (undated) DEVICE — GLOVE INDICATOR PI UNDERGLOVE SZ 7 BLUE

## (undated) DEVICE — SUT SILK 5-0 P-3 18 IN 640G

## (undated) DEVICE — TUBING SUCTION 5MM X 12 FT

## (undated) DEVICE — CHLORAPREP HI-LITE 26ML ORANGE

## (undated) DEVICE — GLOVE SRG BIOGEL 7.5

## (undated) DEVICE — SUT STRATAFIX SPIRAL 4-0 PGA/PCL 7 X 7 CM SXMD2B405

## (undated) DEVICE — 3M™ STERI-STRIP™ COMPOUND BENZOIN TINCTURE 40 BAGS/CARTON 4 CARTONS/CASE C1544: Brand: 3M™ STERI-STRIP™

## (undated) DEVICE — INTENDED FOR TISSUE SEPARATION, AND OTHER PROCEDURES THAT REQUIRE A SHARP SURGICAL BLADE TO PUNCTURE OR CUT.: Brand: BARD-PARKER ® CARBON RIB-BACK BLADES

## (undated) DEVICE — PLUMEPEN PRO 10FT

## (undated) DEVICE — ADHESIVE SKIN HIGH VISCOSITY EXOFIN 1ML

## (undated) DEVICE — ELECTRODE BLADE MOD E-Z CLEAN 2.5IN 6.4CM -0012M

## (undated) DEVICE — SUT MONOCRYL 5-0 P-3 18 IN Y493G

## (undated) DEVICE — BETHLEHEM UNIVERSAL MINOR GEN: Brand: CARDINAL HEALTH

## (undated) DEVICE — PREP PAD BNS: Brand: CONVERTORS

## (undated) DEVICE — NEEDLE 25G X 1 1/2

## (undated) DEVICE — SURGICEL 4 X 8

## (undated) DEVICE — TRAY PAIN SUPPORT

## (undated) DEVICE — ELECTRODE NEEDLE MEGAFINE 2IN E-Z CLEAN MEGADYNE -0118

## (undated) DEVICE — 2000CC GUARDIAN II: Brand: GUARDIAN

## (undated) DEVICE — UNDYED MONOFILAMENT (POLYDIOXANONE), ABSORBABLE SURGICAL SUTURE: Brand: PDS

## (undated) DEVICE — 3M™ STERI-STRIP™ REINFORCED ADHESIVE SKIN CLOSURES, R1547, 1/2 IN X 4 IN (12 MM X 100 MM), 6 STRIPS/ENVELOPE: Brand: 3M™ STERI-STRIP™

## (undated) DEVICE — SUT VICRYL 3-0 SH 27 IN J416H

## (undated) DEVICE — 10FR FRAZIER SUCTION HANDLE: Brand: CARDINAL HEALTH